# Patient Record
Sex: FEMALE | Race: BLACK OR AFRICAN AMERICAN | NOT HISPANIC OR LATINO | Employment: UNEMPLOYED | URBAN - METROPOLITAN AREA
[De-identification: names, ages, dates, MRNs, and addresses within clinical notes are randomized per-mention and may not be internally consistent; named-entity substitution may affect disease eponyms.]

---

## 2017-02-02 ENCOUNTER — TELEPHONE (OUTPATIENT)
Dept: BARIATRICS | Facility: CLINIC | Age: 32
End: 2017-02-02

## 2017-02-02 DIAGNOSIS — E66.01 MORBID OBESITY DUE TO EXCESS CALORIES: Primary | ICD-10-CM

## 2017-02-02 DIAGNOSIS — Z98.84 STATUS POST LAPAROSCOPIC SLEEVE GASTRECTOMY: ICD-10-CM

## 2017-02-02 DIAGNOSIS — M79.606 PAIN OF LOWER EXTREMITY, UNSPECIFIED LATERALITY: ICD-10-CM

## 2017-02-02 NOTE — TELEPHONE ENCOUNTER
----- Message from Pat Baca sent at 2/2/2017  8:32 AM CST -----  Contact: Self   Trisha States that they need a call returned by a nurse in reference to some general questions she has.  Please call Trisha  @ [phone]. Thanks :)

## 2017-02-02 NOTE — TELEPHONE ENCOUNTER
Returned patient call.  Patient stated she moved back to [place].  She wants to resume care.  Patient is a sleeve patient of Dr. Anderson. Patient is interested in referral for paniculectomy.  Patient also stated that she still has her IVC filter in place.  appt and labs scheduled with PA for 2/8/17 &  2/9/16.  Changed patient address in epic Patient's insurance is not listed.  Transferred patient to phone room to update insurance.

## 2017-02-08 ENCOUNTER — LAB VISIT (OUTPATIENT)
Dept: LAB | Facility: HOSPITAL | Age: 32
End: 2017-02-08
Attending: SURGERY
Payer: MEDICAID

## 2017-02-08 DIAGNOSIS — Z98.84 STATUS POST LAPAROSCOPIC SLEEVE GASTRECTOMY: ICD-10-CM

## 2017-02-08 DIAGNOSIS — E55.9 VITAMIN D DEFICIENCY: Primary | ICD-10-CM

## 2017-02-08 DIAGNOSIS — E66.01 MORBID OBESITY DUE TO EXCESS CALORIES: ICD-10-CM

## 2017-02-08 DIAGNOSIS — M79.606 PAIN OF LOWER EXTREMITY, UNSPECIFIED LATERALITY: ICD-10-CM

## 2017-02-08 DIAGNOSIS — R17 ELEVATED BILIRUBIN: ICD-10-CM

## 2017-02-08 LAB
25(OH)D3+25(OH)D2 SERPL-MCNC: 12 NG/ML
ALBUMIN SERPL BCP-MCNC: 3.6 G/DL
ALP SERPL-CCNC: 40 U/L
ALT SERPL W/O P-5'-P-CCNC: 10 U/L
ANION GAP SERPL CALC-SCNC: 4 MMOL/L
AST SERPL-CCNC: 16 U/L
BASOPHILS # BLD AUTO: 0.02 K/UL
BASOPHILS NFR BLD: 0.5 %
BILIRUB SERPL-MCNC: 1.4 MG/DL
BUN SERPL-MCNC: 10 MG/DL
CALCIUM SERPL-MCNC: 9.2 MG/DL
CHLORIDE SERPL-SCNC: 104 MMOL/L
CHOLEST/HDLC SERPL: 3.6 {RATIO}
CO2 SERPL-SCNC: 29 MMOL/L
CREAT SERPL-MCNC: 0.8 MG/DL
DIFFERENTIAL METHOD: ABNORMAL
EOSINOPHIL # BLD AUTO: 0 K/UL
EOSINOPHIL NFR BLD: 0.7 %
ERYTHROCYTE [DISTWIDTH] IN BLOOD BY AUTOMATED COUNT: 13.3 %
EST. GFR  (AFRICAN AMERICAN): >60 ML/MIN/1.73 M^2
EST. GFR  (NON AFRICAN AMERICAN): >60 ML/MIN/1.73 M^2
GLUCOSE SERPL-MCNC: 87 MG/DL
HCT VFR BLD AUTO: 38.9 %
HDL/CHOLESTEROL RATIO: 28.1 %
HDLC SERPL-MCNC: 178 MG/DL
HDLC SERPL-MCNC: 50 MG/DL
HGB BLD-MCNC: 12.9 G/DL
IRON SERPL-MCNC: 112 UG/DL
LDLC SERPL CALC-MCNC: 116.4 MG/DL
LYMPHOCYTES # BLD AUTO: 2.2 K/UL
LYMPHOCYTES NFR BLD: 50.5 %
MCH RBC QN AUTO: 28.4 PG
MCHC RBC AUTO-ENTMCNC: 33.2 %
MCV RBC AUTO: 86 FL
MONOCYTES # BLD AUTO: 0.6 K/UL
MONOCYTES NFR BLD: 12.4 %
NEUTROPHILS # BLD AUTO: 1.6 K/UL
NEUTROPHILS NFR BLD: 35.7 %
NONHDLC SERPL-MCNC: 128 MG/DL
PLATELET # BLD AUTO: 360 K/UL
PMV BLD AUTO: 9.4 FL
POTASSIUM SERPL-SCNC: 4.2 MMOL/L
PROT SERPL-MCNC: 7.2 G/DL
RBC # BLD AUTO: 4.55 M/UL
SATURATED IRON: 29 %
SODIUM SERPL-SCNC: 137 MMOL/L
TOTAL IRON BINDING CAPACITY: 386 UG/DL
TRANSFERRIN SERPL-MCNC: 261 MG/DL
TRIGL SERPL-MCNC: 58 MG/DL
VIT B12 SERPL-MCNC: 475 PG/ML
WBC # BLD AUTO: 4.44 K/UL

## 2017-02-08 PROCEDURE — 82607 VITAMIN B-12: CPT

## 2017-02-08 PROCEDURE — 83540 ASSAY OF IRON: CPT

## 2017-02-08 PROCEDURE — 84425 ASSAY OF VITAMIN B-1: CPT

## 2017-02-08 PROCEDURE — 80061 LIPID PANEL: CPT

## 2017-02-08 PROCEDURE — 85025 COMPLETE CBC W/AUTO DIFF WBC: CPT

## 2017-02-08 PROCEDURE — 80053 COMPREHEN METABOLIC PANEL: CPT

## 2017-02-08 PROCEDURE — 82306 VITAMIN D 25 HYDROXY: CPT

## 2017-02-08 PROCEDURE — 36415 COLL VENOUS BLD VENIPUNCTURE: CPT

## 2017-02-08 RX ORDER — ERGOCALCIFEROL 1.25 MG/1
50000 CAPSULE ORAL
Qty: 24 CAPSULE | Refills: 0 | Status: SHIPPED | OUTPATIENT
Start: 2017-02-09 | End: 2017-02-09 | Stop reason: SDUPTHER

## 2017-02-09 ENCOUNTER — OFFICE VISIT (OUTPATIENT)
Dept: BARIATRICS | Facility: CLINIC | Age: 32
End: 2017-02-09
Payer: MEDICAID

## 2017-02-09 VITALS
WEIGHT: 246.5 LBS | HEIGHT: 59 IN | DIASTOLIC BLOOD PRESSURE: 76 MMHG | SYSTOLIC BLOOD PRESSURE: 120 MMHG | BODY MASS INDEX: 49.69 KG/M2 | HEART RATE: 92 BPM

## 2017-02-09 DIAGNOSIS — L30.9 DERMATITIS: ICD-10-CM

## 2017-02-09 DIAGNOSIS — R79.89 ELEVATED LFTS: Primary | ICD-10-CM

## 2017-02-09 DIAGNOSIS — Z98.84 S/P LAPAROSCOPIC SLEEVE GASTRECTOMY: ICD-10-CM

## 2017-02-09 DIAGNOSIS — E55.9 VITAMIN D DEFICIENCY: ICD-10-CM

## 2017-02-09 DIAGNOSIS — E66.01 MORBID OBESITY WITH BMI OF 45.0-49.9, ADULT: ICD-10-CM

## 2017-02-09 DIAGNOSIS — E65 ABDOMINAL PANNUS: ICD-10-CM

## 2017-02-09 PROCEDURE — 99999 PR PBB SHADOW E&M-EST. PATIENT-LVL III: CPT | Mod: PBBFAC,,, | Performed by: PHYSICIAN ASSISTANT

## 2017-02-09 PROCEDURE — 99205 OFFICE O/P NEW HI 60 MIN: CPT | Mod: S$PBB,,, | Performed by: PHYSICIAN ASSISTANT

## 2017-02-09 PROCEDURE — 99213 OFFICE O/P EST LOW 20 MIN: CPT | Mod: PBBFAC | Performed by: PHYSICIAN ASSISTANT

## 2017-02-09 RX ORDER — ERGOCALCIFEROL 1.25 MG/1
50000 CAPSULE ORAL
Qty: 24 CAPSULE | Refills: 0 | Status: SHIPPED | OUTPATIENT
Start: 2017-02-09 | End: 2017-05-02

## 2017-02-09 RX ORDER — NYSTATIN 100000 [USP'U]/G
POWDER TOPICAL 2 TIMES DAILY
Qty: 60 G | Refills: 5 | Status: SHIPPED | OUTPATIENT
Start: 2017-02-09 | End: 2017-05-02

## 2017-02-09 RX ORDER — CLOTRIMAZOLE AND BETAMETHASONE DIPROPIONATE 10; .64 MG/G; MG/G
CREAM TOPICAL 2 TIMES DAILY
Qty: 45 G | Refills: 5 | Status: SHIPPED | OUTPATIENT
Start: 2017-02-09 | End: 2017-05-02

## 2017-02-09 RX ORDER — OMEPRAZOLE 40 MG/1
40 CAPSULE, DELAYED RELEASE ORAL EVERY MORNING
Qty: 30 CAPSULE | Refills: 12 | Status: SHIPPED | OUTPATIENT
Start: 2017-02-09 | End: 2017-05-02

## 2017-02-09 NOTE — PATIENT INSTRUCTIONS
- To lose weight you want to cut 100% starchy carbohydrates out of your diet (bread, rice, pasta, potatoes, granola, flour, corn, peas, oatmeal, grits, tortillas, crackers, chips) and get  grams of protein.  Aim for 100 grams of protein daily.    - Premier Protein (Chocolate, Bananas & Cream, Strawberries & Cream, Vanilla) [employer] or [employer]    - Syntrax [place] from [employer], www.bariatricadvantage.com, www.bariatricchoice.com. (LACTOSE FREE)    - Atkins Lift - [employer] & [employer] (LACTOSE FREE)    - Veggetti Pro from [employer], [employer], [employer]    - www.pinterest.com (cauliflower, cloud bread, quest bar cookies, eggplant, zucchini, zucchini noodles, crustless quiche, no carb meals, taco lettuce boats)    - http://rajendra.Sigmatix.com/    - Cauliflower Rice

## 2017-02-09 NOTE — PROGRESS NOTES
BARIATRIC FOLLOW UP    Chief Complaint   Patient presents with    Follow-up     sleeve 2013     HPI: Trisha Ferguson is a 31 y.o. female with a Body mass index is 49.78 kg/(m^2). who presents for a follow up of Sleeve Gastrectomy on 11/26/2013 with Dr. Anderson.  She is tolerating the diet without difficulty.  Her lowest weight after surgery was 202 lbs.  She has regained about 45 lbs after a scare of her possibly having Uterine Cancer.  She does not have cancer.  She is finding it difficult to get the weight off, especially since the excess skin in her abdomen and thighs are making it difficulty to exercise.  She gets rashes and breakdown of skin that do not respond to treatment.  She has lost about 99 lbs, approximately 45% of her excess weight.  She would like a consult to plastic surgery.                                                                       EXERCISE and VITAMINS: Reviewed in bariatric assessment.                                                                                DIET:  Regular Bariatric Diet.  Breakfast: 1 hardboiled egg (8g), Sn: 1 string cheese (6 g), Lunch: Protein shake (15 g), Sn: Protein shake (15 g), Di: green beans and baked chicken (20-25 g).  ~65-70 grams protein daily.                                                                                                                                 MEDICATIONS AND ALLERGIES:  Reviewed in Navigator.    Review of Systems   Constitutional: Negative for chills, diaphoresis, fatigue and fever.   HENT: Negative for facial swelling, sore throat and trouble swallowing.    Eyes: Negative for photophobia and visual disturbance.   Respiratory: Negative for cough, choking, chest tightness and shortness of breath.    Cardiovascular: Negative for chest pain, palpitations and leg swelling.   Gastrointestinal: Positive for constipation (sometimes). Negative for abdominal distention, abdominal pain, anal bleeding, blood in stool, diarrhea,  nausea, rectal pain and vomiting.   Genitourinary: Positive for menstrual problem (longer and heavier period). Negative for decreased urine volume, difficulty urinating, dysuria, enuresis, frequency and hematuria.   Musculoskeletal: Positive for arthralgias (leg pain), myalgias (calf ) and neck pain. Negative for back pain, gait problem, joint swelling and neck stiffness.   Skin: Positive for rash (under excess skin of abdomen and in inner thighs). Negative for color change.   Neurological: Negative for dizziness, seizures, syncope, weakness, light-headedness, numbness and headaches.   Psychiatric/Behavioral: Positive for behavioral problems.     Vitals:    02/09/17 1402   BP: 120/76   Pulse: 92     Physical Exam   Constitutional: She is oriented to person, place, and time. She appears well-developed and well-nourished.   HENT:   Head: Normocephalic and atraumatic.   Cardiovascular: Normal rate, regular rhythm, normal heart sounds and intact distal pulses.    Pulmonary/Chest: Effort normal and breath sounds normal.   Abdominal: Soft. Bowel sounds are normal. She exhibits no distension and no mass. There is no tenderness. There is no rebound and no guarding.   Musculoskeletal: Normal range of motion. She exhibits no edema or tenderness.   Neurological: She is alert and oriented to person, place, and time.   Skin: Skin is warm and dry. No rash noted. No erythema. No pallor.        Nursing note and vitals reviewed.      ASSESSMENT:  - Dermatitis and skin breakdown under abdominal pannus and on inner thigh.  - Morbid Obesity, Body mass index is 49.78 kg/(m^2). s/p Lap Sleeve on 11/26/2013  - Good weight loss, 100 lbs, 45% EWL .  -  Co Morbidities: none  -  Fair diet, inadequate protein intake  -  No exercise, limited d/t skin breakdown and chaffing of thighs.  -  Fair vitamin regimen.  -  Not at risk for fall or abuse.      PLAN:  - [facility] Consult for excess skin removal/panniculectomy.  - Reviewed the  nutrition booklet and diet.  She needs to increase her protein intake and take out all starches from her diet.  Gave handouts, education and all questions answered.  - No Bariatric Registered Dietician Available.  All Diet education and counseling done by PA.  - Patient to follow bariatric diet plan.  - Patient to exercise on a regular basis.  - Patient to take bariatric vitamin regimen: MVI, calcium citrate, and Vitamin B12 as directed.  - Call the office for any issues.  - Labs today.  - RTC in 1 month with food records.    60 minute visit, over 50% of time spent counseling patient face to face on diet, exercise, and weight loss.

## 2017-02-09 NOTE — MR AVS SNAPSHOT
[facility]  [address] [facility] [address]  Phone: [phone]  Fax: [phone]                  Trisha Ferguson   2017 1:40 PM   Office Visit    Description:  Female : 1985   Provider:  Veronika Pugh PA-C   Department:  [facility] - Bariatric Surgery           Reason for Visit     Follow-up           Diagnoses this Visit        Comments    Elevated LFTs    -  Primary     Vitamin D deficiency                To Do List           Goals (5 Years of Data)     None       These Medications        Disp Refills Start End    omeprazole (PRILOSEC) 40 MG capsule 30 capsule 12 2017     Take 1 capsule (40 mg total) by mouth every morning. - Oral    Pharmacy: [facility] [address] [place] [address] Ph #: [phone]       nystatin (MYCOSTATIN) powder 60 g 5 2017     Apply topically 2 (two) times daily. - Topical (Top)    Pharmacy: [facility] [address] [place] [address] Ph #: [phone]       clotrimazole-betamethasone 1-0.05% (LOTRISONE) cream 45 g 5 2017     Apply topically 2 (two) times daily. - Topical (Top)    Pharmacy: [facility] [address] [place] [address] Ph #: [phone]       ergocalciferol (ERGOCALCIFEROL) 50,000 unit Cap 24 capsule 0 2017     Take 1 capsule (50,000 Units total) by mouth twice a week. - Oral    Pharmacy: [facility] [address] [place] [address] Ph #: [phone]         [facility]     [facility] On [facility] Nurse Care Line -  Assistance  Registered nurses in the [facility] provide clinical advisement, health education, appointment booking, and other advisory services.  Call for this free service at [phone].             Medications           Message regarding Medications     Verify the changes and/or additions to your medication regime listed below are the same as discussed with your clinician today.  If any of these changes or  "additions are incorrect, please notify your healthcare provider.        START taking these NEW medications        Refills    omeprazole (PRILOSEC) 40 MG capsule 12    Sig: Take 1 capsule (40 mg total) by mouth every morning.    Class: Normal    Route: Oral    nystatin (MYCOSTATIN) powder 5    Sig: Apply topically 2 (two) times daily.    Class: Normal    Route: Topical (Top)    clotrimazole-betamethasone 1-0.05% (LOTRISONE) cream 5    Sig: Apply topically 2 (two) times daily.    Class: Normal    Route: Topical (Top)           Verify that the below list of medications is an accurate representation of the medications you are currently taking.  If none reported, the list may be blank. If incorrect, please contact your healthcare provider. Carry this list with you in case of emergency.           Current Medications     cyanocobalamin, vitamin B-12, (VITAMIN B-12) 1,000 mcg Subl Place under the tongue.    ergocalciferol (ERGOCALCIFEROL) 50,000 unit Cap Take 1 capsule (50,000 Units total) by mouth twice a week.    multivitamin (ONE DAILY MULTIVITAMIN) per tablet Take 1 tablet by mouth once daily.    clotrimazole-betamethasone 1-0.05% (LOTRISONE) cream Apply topically 2 (two) times daily.    nystatin (MYCOSTATIN) powder Apply topically 2 (two) times daily.    omeprazole (PRILOSEC) 40 MG capsule Take 1 capsule (40 mg total) by mouth every morning.           Clinical Reference Information           Your Vitals Were     BP Pulse Height Weight Last Period BMI    120/76 92 4' 11" (1.499 m) 111.8 kg (246 lb 7.6 oz) 01/15/2017 49.78 kg/m2      Blood Pressure          Most Recent Value    BP  120/76      Allergies as of 2/9/2017     No Known Allergies      Immunizations Administered on Date of Encounter - 2/9/2017     None      Orders Placed During Today's Visit     Future Labs/Procedures Expected by Expires    Hepatic function panel  2/9/2017 4/10/2018      Instructions    - To lose weight you want to cut 100% starchy " carbohydrates out of your diet (bread, rice, pasta, potatoes, granola, flour, corn, peas, oatmeal, grits, tortillas, crackers, chips) and get  grams of protein.  Aim for 100 grams of protein daily.    - Premier Protein (Chocolate, Bananas & Cream, Strawberries & Cream, Vanilla) [employer] or [employer]    - Syntrax [place] from [employer], www.bariatricadvantage.com, www.bariatricchoice.com. (LACTOSE FREE)    - Atkins Lift - [employer] & [employer] (LACTOSE FREE)    - Veggetti Pro from [employer], [employer], [employer]    - www.pinterest.com (cauliflower, cloud bread, quest bar cookies, eggplant, zucchini, zucchini noodles, crustless quiche, no carb meals, taco lettuce boats)    - http://theworldaccordingtoegnoe.DiabetOmics.com/    - Cauliflower Rice       Language Assistance Services     ATTENTION: Language assistance services are available, free of charge. Please call [phone].      ATENCIÓN: Si habla español, tiene a ye disposición servicios gratuitos de asistencia lingüística. Llame al [phone].     RINA Ý: N?u b?n nói Ti?ng Vi?t, có các d?ch v? h? tr? ngôn ng? mi?n phí dành cho b?n. G?i s? [phone].         [facility] complies with applicable Federal civil rights laws and does not discriminate on the basis of race, color, national origin, age, disability, or sex.

## 2017-02-09 NOTE — LETTER
February 14, 2017    [facility]               [facility]  [address]  [place] [address]  Phone: [phone]  Fax: [phone]   Patient: Trisha Ferguson   MR Number: [id number]   YOB: 1985   Date of Visit: 2/9/2017       Dear [facility]:    I am referring my patient, Trisha Ferguson, to you for evaluation of abdominal pannus and excess skin of upper thighs.   She had a Sleeve Gastrectomy in 2013 and has lost more than 100 lbs.  She is having difficulty with exercise and walking causing skin breakdown of inner thighs.  She is getting rashes under the abdomen and inner thighs and neither respond to treatment.  In my medical opinion an abdominal panniculectomy is medically necessary.    I appreciate your assistance in her care and look forward to your findings and recommendations.    Sincerely,                         Veronika Joyce PA-C

## 2017-02-10 LAB — VIT B1 SERPL-MCNC: 39 UG/L (ref 38–122)

## 2017-02-14 PROBLEM — Z98.84 S/P LAPAROSCOPIC SLEEVE GASTRECTOMY: Status: ACTIVE | Noted: 2017-02-14

## 2017-02-14 PROBLEM — L30.9 DERMATITIS: Status: ACTIVE | Noted: 2017-02-14

## 2017-02-14 PROBLEM — E66.01 MORBID OBESITY WITH BMI OF 45.0-49.9, ADULT: Status: ACTIVE | Noted: 2017-02-14

## 2017-02-14 PROBLEM — R79.89 ELEVATED LFTS: Status: ACTIVE | Noted: 2017-02-14

## 2017-03-22 ENCOUNTER — PATIENT MESSAGE (OUTPATIENT)
Dept: BARIATRICS | Facility: CLINIC | Age: 32
End: 2017-03-22

## 2017-03-22 ENCOUNTER — TELEPHONE (OUTPATIENT)
Dept: BARIATRICS | Facility: CLINIC | Age: 32
End: 2017-03-22

## 2017-03-22 NOTE — TELEPHONE ENCOUNTER
Called in respond to patient's message.    Patient reports that her company recently changed the chairs that she sits in and that the seats are much lower than her old chair and her IVC filter hurts her. At the advisement of her company's HR department, she called to request a letter stating that she needs different seating accomodations 2/2 IVC filter placement.     Gave her the contact information for Vascular Surgery, [phone] or [phone]. Encouraged patient to call clinic back if she needed any further assistance.

## 2017-03-23 ENCOUNTER — HOSPITAL ENCOUNTER (OUTPATIENT)
Dept: VASCULAR SURGERY | Facility: CLINIC | Age: 32
Discharge: HOME | End: 2017-03-23
Attending: SURGERY
Payer: MEDICAID

## 2017-03-23 ENCOUNTER — OFFICE VISIT (OUTPATIENT)
Dept: VASCULAR SURGERY | Facility: CLINIC | Age: 32
End: 2017-03-23
Payer: MEDICAID

## 2017-03-23 VITALS
WEIGHT: 262 LBS | DIASTOLIC BLOOD PRESSURE: 67 MMHG | SYSTOLIC BLOOD PRESSURE: 111 MMHG | BODY MASS INDEX: 49.47 KG/M2 | TEMPERATURE: 99 F | HEIGHT: 61 IN | HEART RATE: 98 BPM

## 2017-03-23 DIAGNOSIS — M79.609 PAIN IN LIMB: ICD-10-CM

## 2017-03-23 DIAGNOSIS — M79.605 PAIN OF LEFT LOWER EXTREMITY: Primary | ICD-10-CM

## 2017-03-23 DIAGNOSIS — E66.01 MORBID OBESITY WITH BMI OF 45.0-49.9, ADULT: Primary | ICD-10-CM

## 2017-03-23 DIAGNOSIS — E66.01 MORBID OBESITY WITH BMI OF 45.0-49.9, ADULT: ICD-10-CM

## 2017-03-23 PROCEDURE — 99213 OFFICE O/P EST LOW 20 MIN: CPT | Mod: PBBFAC | Performed by: SURGERY

## 2017-03-23 PROCEDURE — 99204 OFFICE O/P NEW MOD 45 MIN: CPT | Mod: S$PBB,,, | Performed by: SURGERY

## 2017-03-23 PROCEDURE — 99999 PR PBB SHADOW E&M-EST. PATIENT-LVL III: CPT | Mod: PBBFAC,,, | Performed by: SURGERY

## 2017-03-23 PROCEDURE — 93970 EXTREMITY STUDY: CPT | Mod: 26,S$PBB,, | Performed by: SURGERY

## 2017-03-23 NOTE — PROGRESS NOTES
Trisha Ferguson  2017    HPI:  Patient is a 31 y.o. female with a h/o morbid obesity s.p bariatric procedure (lap sleeve gastrectomy in )  who is here today for evaluation of her previously-placed filter  She is now early in her 2nd pregnancy.  Unfortunately, she did not fu within her post-op period and this is the 1st time w fu since 2013    S/p  2013:   Insertion of IVC filter - R CFV: Bard Baxter    Findings/D[employer]:  Successful deployment in IVC; no tilt noted    Denies diabetes, hypertension, hyperlipidemia, DVT or PE, heart disease    Past Medical History:   Diagnosis Date    Cervical dysplasia     Gonorrhea     Morbid obesity      Past Surgical History:   Procedure Laterality Date    CERVIX SURGERY       SECTION, CLASSIC      CHOLECYSTECTOMY           DILATION AND CURETTAGE OF UTERUS      GASTRECTOMY  2013    Sleeve    gastric sleeve  13     Family History   Problem Relation Age of Onset    Diabetes Maternal Grandmother       of MI at 63     Heart disease Maternal Grandmother     Hypertension Maternal Grandmother     Stroke Maternal Grandmother     Obesity Maternal Grandmother     Obesity Mother       at 26 years of Gun shot wound    Diabetes Mother     Diabetes Father      Social History     Social History    Marital status: Single     Spouse name: N/A    Number of children: N/A    Years of education: N/A     Occupational History    Not on file.     Social History Main Topics    Smoking status: Never Smoker    Smokeless tobacco: Not on file    Alcohol use No      Comment: rare use (on occasion only)    Drug use: No    Sexual activity: Yes     Partners: Male     Other Topics Concern    Not on file     Social History Narrative       Current Outpatient Prescriptions:     clotrimazole-betamethasone 1-0.05% (LOTRISONE) cream, Apply topically 2 (two) times daily., Disp: 45 g, Rfl: 5    cyanocobalamin, vitamin B-12,  (VITAMIN B-12) 1,000 mcg Subl, Place under the tongue., Disp: , Rfl:     ergocalciferol (ERGOCALCIFEROL) 50,000 unit Cap, Take 1 capsule (50,000 Units total) by mouth twice a week., Disp: 24 capsule, Rfl: 0    multivitamin (ONE DAILY MULTIVITAMIN) per tablet, Take 1 tablet by mouth once daily., Disp: , Rfl:     nystatin (MYCOSTATIN) powder, Apply topically 2 (two) times daily., Disp: 60 g, Rfl: 5    omeprazole (PRILOSEC) 40 MG capsule, Take 1 capsule (40 mg total) by mouth every morning., Disp: 30 capsule, Rfl: 12    REVIEW OF SYSTEMS:  General: negative; ENT: negative; Allergy and Immunology: negative; Hematological and Lymphatic: Negative; Endocrine: negative; Respiratory: no cough, shortness of breath, or wheezing; Cardiovascular: no chest pain or dyspnea on exertion; Gastrointestinal: no abdominal pain/back, change in bowel habits, or bloody stools; Genito-Urinary: no dysuria, trouble voiding, or hematuria; Musculoskeletal: negative  Neurological: no TIA or stroke symptoms; Psychiatric: no nervousness, anxiety or depression.    PHYSICAL EXAM:   Right Arm BP - Sittin/67 (17 1325)  Left Arm BP - Sittin/98 (17 1325)  Pulse: 98  Temp: 98.8 °F (37.1 °C)      General appearance:  Alert, well-appearing, and in no distress.  Oriented to person, place, and time. Obesity   Neurological: Normal speech, no focal findings noted; CN II - XII grossly intact           Musculoskeletal: Digits/nail without cyanosis/clubbing.  Normal muscle strength/tone.                 Neck: Supple, no significant adenopathy; thyroid is not enlarged                  No carotid bruit can be auscultated                Chest:  Clear to auscultation, no wheezes, rales or rhonchi, symmetric air entry     No use of accessory muscles             Cardiac: Normal rate and regular rhythm, S1 and S2 normal; PMI non-displaced          Abdomen: Soft, nontender, nondistended, no masses or organomegaly     No rebound tenderness  noted; bowel sounds normal     Pulsatile aortic mass is not palpable.     No groin adenopathy      Extremities:   2+ femoral pulses bilaterally     No ulcerations    LAB RESULTS:  Lab Results   Component Value Date    K 4.2 02/08/2017    K 3.7 10/02/2014    K 3.2 (L) 12/14/2013    CREATININE 0.8 02/08/2017    CREATININE 0.7 10/02/2014    CREATININE 0.9 12/14/2013     Lab Results   Component Value Date    WBC 4.44 02/08/2017    WBC 7.39 10/02/2014    WBC 7.05 12/11/2013    HCT 38.9 02/08/2017    HCT 38.1 10/02/2014    HCT 41.8 12/11/2013     (H) 02/08/2017     (H) 10/02/2014     (H) 12/11/2013     Lab Results   Component Value Date    HGBA1C 5.6 10/11/2013     IMAGING:  US: no DVT    IMP/PLAN:  31 y.o. female with  h/o morbid obesity s.p bariatric procedure (lap sleeve gastrectomy in 2013)  who is here today for evaluation of her previously-placed filter.  Unfortunately, she did not fu within her post-op period and this is the 1st time w fu since Nov 2013  I explained to her that it would not be wise to attempt to retrieve this now given that it has been in now for > 3 1/2 yrs and risk of ivc injury.  She understands that the missed safe window for retrieval has passed.  Fu JAYY Malik MD FACS  Vascular/Endovascular Surgery

## 2017-04-11 ENCOUNTER — HOSPITAL ENCOUNTER (EMERGENCY)
Facility: HOSPITAL | Age: 32
Discharge: HOME | End: 2017-04-11
Attending: EMERGENCY MEDICINE
Payer: MEDICAID

## 2017-04-11 VITALS
BODY MASS INDEX: 52.22 KG/M2 | SYSTOLIC BLOOD PRESSURE: 116 MMHG | TEMPERATURE: 98 F | OXYGEN SATURATION: 97 % | HEART RATE: 74 BPM | HEIGHT: 60 IN | RESPIRATION RATE: 17 BRPM | DIASTOLIC BLOOD PRESSURE: 58 MMHG | WEIGHT: 266 LBS

## 2017-04-11 DIAGNOSIS — O20.0 THREATENED MISCARRIAGE IN EARLY PREGNANCY: Primary | ICD-10-CM

## 2017-04-11 LAB
BACTERIA #/AREA URNS HPF: ABNORMAL /HPF
BACTERIA GENITAL QL WET PREP: ABNORMAL
BILIRUB UR QL STRIP: NEGATIVE
CLARITY UR: CLEAR
CLUE CELLS VAG QL WET PREP: ABNORMAL
COLOR UR: YELLOW
FILAMENT FUNGI VAG WET PREP-#/AREA: ABNORMAL
GLUCOSE UR QL STRIP: NEGATIVE
HCG INTACT+B SERPL-ACNC: NORMAL MIU/ML
HGB UR QL STRIP: ABNORMAL
KETONES UR QL STRIP: NEGATIVE
LEUKOCYTE ESTERASE UR QL STRIP: NEGATIVE
MICROSCOPIC COMMENT: ABNORMAL
NITRITE UR QL STRIP: NEGATIVE
PH UR STRIP: 5 [PH] (ref 5–8)
PROT UR QL STRIP: NEGATIVE
RBC #/AREA URNS HPF: 5 /HPF (ref 0–4)
SP GR UR STRIP: 1.02 (ref 1–1.03)
SPECIMEN SOURCE: ABNORMAL
SQUAMOUS #/AREA URNS HPF: 2 /HPF
T VAGINALIS GENITAL QL WET PREP: ABNORMAL
URN SPEC COLLECT METH UR: ABNORMAL
UROBILINOGEN UR STRIP-ACNC: ABNORMAL EU/DL
WBC #/AREA URNS HPF: 2 /HPF (ref 0–5)
WBC #/AREA VAG WET PREP: ABNORMAL
YEAST GENITAL QL WET PREP: ABNORMAL

## 2017-04-11 PROCEDURE — 84702 CHORIONIC GONADOTROPIN TEST: CPT

## 2017-04-11 PROCEDURE — 87210 SMEAR WET MOUNT SALINE/INK: CPT

## 2017-04-11 PROCEDURE — 81000 URINALYSIS NONAUTO W/SCOPE: CPT

## 2017-04-11 PROCEDURE — 99283 EMERGENCY DEPT VISIT LOW MDM: CPT

## 2017-04-11 PROCEDURE — 87591 N.GONORRHOEAE DNA AMP PROB: CPT

## 2017-04-11 NOTE — ED AVS SNAPSHOT
[facility]  [place] [address]               Trisha Paniaguatis   2017  6:45 PM   ED    Description:  Female : 1985   Department:  [facility]           Your Care was Coordinated By:     Provider Role From To    Matt Dorado III, MD Attending Provider 17 --    Kavon Ochoa PA-C Physician Assistant 17 --      Reason for Visit     Abdominal Cramping     Vaginal Bleeding           Diagnoses this Visit        Comments    Threatened miscarriage in early pregnancy    -  Primary       ED Disposition     None           To Do List           Follow-up Information     Follow up with OLGA Coates MD. Schedule an appointment as soon as possible for a visit in 1 day.    Specialty:  Obstetrics    Why:  For further evaluation    Contact information:    [address]  [facility] [address] [phone]          Go to [facility].    Specialty:  Emergency Medicine    Why:  If symptoms worsen    Contact information:    [address] [place] [address] [phone]      [facility]     [facility] Nurse Care Line - 24/ Assistance  Unless otherwise directed by your provider, please contact [facility], our nurse care line that is available for 24/ assistance.     Registered nurses in the [facility] provide: appointment scheduling, clinical advisement, health education, and other advisory services.  Call: [phone] (toll free)               Medications           Message regarding Medications     Verify the changes and/or additions to your medication regime listed below are the same as discussed with your clinician today.  If any of these changes or additions are incorrect, please notify your healthcare provider.             Verify that the below list of medications is an accurate representation of the medications you are currently taking.  If  none reported, the list may be blank. If incorrect, please contact your healthcare provider. Carry this list with you in case of emergency.           Current Medications     PRENATAL VIT/IRON FUMARATE/FA (PRENATAL 1+1 ORAL) Take by mouth.    clotrimazole-betamethasone 1-0.05% (LOTRISONE) cream Apply topically 2 (two) times daily.    cyanocobalamin, vitamin B-12, (VITAMIN B-12) 1,000 mcg Subl Place under the tongue.    ergocalciferol (ERGOCALCIFEROL) 50,000 unit Cap Take 1 capsule (50,000 Units total) by mouth twice a week.    multivitamin (ONE DAILY MULTIVITAMIN) per tablet Take 1 tablet by mouth once daily.    nystatin (MYCOSTATIN) powder Apply topically 2 (two) times daily.    omeprazole (PRILOSEC) 40 MG capsule Take 1 capsule (40 mg total) by mouth every morning.           Clinical Reference Information           Your Vitals Were     BP Pulse Temp Resp Height Weight    116/58 (BP Location: Left arm, Patient Position: Sitting, BP Method: Automatic) 74 98.1 °F (36.7 °C) (Oral) 17 5' (1.524 m) 120.7 kg (266 lb)    Last Period SpO2 BMI          2017 97% 51.95 kg/m2        Allergies as of 2017     No Known Allergies      Immunizations Administered on Date of Encounter - 2017     None      ED Micro, Lab, POCT     Start Ordered       Status Ordering Provider    17  hCG, quantitative, pregnancy  STAT      Final result     17  Vaginal Screen Vagina  STAT      In process     17  C. trachomatis/N. gonorrhoeae by AMP DNA Cervix  STAT      In process     17  Urinalysis  STAT      Final result     17  Urinalysis Microscopic  Once      Final result     17    Once,   Status:  Canceled      Canceled       ED Imaging Orders     None        Discharge Instructions         Possible Miscarriage (Threatened )  You may be having a miscarriage.  Common signs of a  miscarriage are pain and bleeding. A small amount of bleeding can be normal during the first 3 months of pregnancy. Often the pain and bleeding stop, and you have a normal pregnancy and baby. But heavy bleeding or severe cramping can be an early sign of miscarriage. A miscarriage means an unexpected loss of your pregnancy.  At this time, your healthcare provider doesnt know whether you will have a miscarriage, or if things will clear up and your pregnancy will continue normally. This can be emotionally difficult. There is little that can be done to change the way you feel. But understand that miscarriages are common.  About 1 or 2 out of every 10 pregnancies end this way. Some even end before you know you are pregnant. This happens for a number of reasons, and usually the cause is never known. Its important you know that it is not your fault. It didnt happen because you did anything wrong.  Having sex or exercising does not cause a miscarriage. These activities are usually safe unless you have pain or bleeding or your doctor tells you to stop. Even minor falls wont cause a miscarriage. Miscarriages happen because things were not developing as they were supposed to. No medicine can prevent a miscarriage.  Again, understand that things are uncertain right now. You may still have some bleeding. This may be light spotting or like a period, and you may pass some tissue. You may have some cramping. This is why follow-up care is important.  Home care  To improve the chance of keeping your pregnancy, you should take these steps:  · Rest in bed until the pain and bleeding stop.  · Dont have sex until your healthcare provider says its OK.  · Use sanitary napkins instead of tampons.  · Dont douche.  · Dont take aspirin, ibuprofen, or naproxen.  · Dont have alcoholic or caffeinated beverages or smoke.  Follow-up care  Make an appointment with your doctor within the next week, or as directed.  If you had an  ultrasound, a radiologist will review it. You will be told of any new findings that may affect your care.  Call 911  Call 911 if you have:  · Severe pain and very heavy bleeding  · Severe lightheadedness, passing out, or fainting  · Rapid heart rate  · Difficulty breathing  · Confusion or difficulty waking up  When to seek medical advice  Call your healthcare provider right away if any of these occur:  · Vaginal bleeding or pain that lasts for more than 3 days  · Heavy bleeding. This means soaking 1 new pad an hour over 3 hours.  · Fever of 100.4°F (38°C) or higher, or as directed by your healthcare provider  · Pain in your lower belly (abdomen) that gets worse  · Weakness or dizziness  · Passage of anything that resembles tissue. This would be pink or grayish membrane or solid material. Save the tissue in a clean container and bring it to your provider.  Date Last Reviewed: 9/1/2016  © 0498-5250 [employer]. [address] [place] [address]. All rights reserved. This information is not intended as a substitute for professional medical care. Always follow your healthcare professional's instructions.           [facility] complies with applicable Federal civil rights laws and does not discriminate on the basis of race, color, national origin, age, disability, or sex.        Language Assistance Services     ATTENTION: Language assistance services are available, free of charge. Please call [phone].      ATENCIÓN: Si habla español, tiene a ye disposición servicios gratuitos de asistencia lingüística. Llame al [phone].     CHÚ Ý: N?u b?n nói Ti?ng Vi?t, có các d?ch v? h? tr? ngôn ng? mi?n phí dành cho b?n. G?i s? [phone].

## 2017-04-11 NOTE — ED TRIAGE NOTES
Patient came in PER personal transport with c/o abdominal cramping and bleeding. Patient states that she is 8 weeks pregnant.

## 2017-04-12 LAB
C TRACH DNA SPEC QL NAA+PROBE: NOT DETECTED
N GONORRHOEA DNA SPEC QL NAA+PROBE: NOT DETECTED

## 2017-04-12 NOTE — ED PROVIDER NOTES
Encounter Date: 2017    SCRIBE #1 NOTE: I, Bari Petty , am scribing for, and in the presence of,  Kavon Ochoa PA-C . I have scribed the following portions of the note - Other sections scribed: HPI, ROS .       History     Chief Complaint   Patient presents with    Abdominal Cramping     States she is 8 weeks pregnant, cramping, and bleeding a lot    Vaginal Bleeding     Review of patient's allergies indicates:  No Known Allergies  HPI Comments: CC: Abdominal Cramping; Vaginal Bleeding     HPI: This 31 y.o. 8 weeks Gravid female, A1, with a medical history of Cervical dysplasia; Gonorrhea; history of miscarriage (1x episode); and Morbid obesity presents to the ED c/o multiple medical complaints. Pt is c/o acute onset bilateral lower abdominal pain that radiates to the lower back that began a few hours PTA. Pt is c/o acute onset vaginal bleeding with clotting that began a few hours PTA. Pt is unsure of any vaginal discharge. Pt reports moderate pain (7/10) that is exacerbated with palpation. Pt was seen at pt's OB/GYN earlier today for regular checkup and before any symptoms began. OB/GYN reports possible ovarian cyst, but pt cannot recall what side the cyst is on. Pt denies any fever, chest pain, N/V/D, dysuria, urine frequency change, or any other associated symptoms. Pt has no modifying factors. Pt has no prior treatment.         The history is provided by the patient. No  was used.     Past Medical History:   Diagnosis Date    Cervical dysplasia     Gonorrhea     Morbid obesity      Past Surgical History:   Procedure Laterality Date    CERVIX SURGERY       SECTION, CLASSIC      CHOLECYSTECTOMY           DILATION AND CURETTAGE OF UTERUS      GASTRECTOMY  2013    Sleeve    gastric sleeve  ..     Family History   Problem Relation Age of Onset    Diabetes Maternal Grandmother       of MI at 63     Heart disease Maternal Grandmother      Hypertension Maternal Grandmother     Stroke Maternal Grandmother     Obesity Maternal Grandmother     Obesity Mother       at 26 years of Gun shot wound    Diabetes Mother     Diabetes Father      Social History   Substance Use Topics    Smoking status: Never Smoker    Smokeless tobacco: None    Alcohol use No      Comment: rare use (on occasion only)     Review of Systems   Constitutional: Negative for fever.   HENT: Negative for sore throat.    Eyes: Negative for pain.   Respiratory: Negative for shortness of breath.    Cardiovascular: Negative for chest pain.   Gastrointestinal: Positive for abdominal pain (lower ). Negative for diarrhea, nausea and vomiting.   Genitourinary: Positive for vaginal bleeding (with clotting). Negative for dysuria and frequency.   Musculoskeletal: Positive for back pain (lower).   Skin: Negative for rash.   Neurological: Negative for headaches.       Physical Exam   Initial Vitals   BP Pulse Resp Temp SpO2   17 1801 17 1801 17 1801 17 1801 17 1801   135/68 80 18 98.2 °F (36.8 °C) 98 %     Physical Exam    Nursing note and vitals reviewed.  Constitutional: She appears well-developed and well-nourished. She is not diaphoretic. No distress.   HENT:   Head: Normocephalic and atraumatic.   Nose: Nose normal.   Eyes: Conjunctivae and EOM are normal. Right eye exhibits no discharge. Left eye exhibits no discharge.   Neck: Normal range of motion. No tracheal deviation present. No JVD present.   Cardiovascular: Normal rate, regular rhythm and normal heart sounds. Exam reveals no friction rub.    No murmur heard.  Pulmonary/Chest: Breath sounds normal. No stridor. No respiratory distress. She has no wheezes. She has no rhonchi. She has no rales. She exhibits no tenderness.   Abdominal: Soft. There is no tenderness. There is no rigidity, no rebound, no guarding, no CVA tenderness, no tenderness at McBurney's point and negative Rolon's sign.    Gravid abdomen   Genitourinary: Pelvic exam was performed with patient supine. There is no rash, tenderness or lesion on the right labia. There is no rash, tenderness or lesion on the left labia. Cervix exhibits no motion tenderness, no discharge and no friability. Right adnexum displays no mass and no tenderness. Left adnexum displays no mass and no tenderness. There is bleeding (scant bleeding from cervix) in the vagina. No erythema or tenderness in the vagina. No foreign body in the vagina. No signs of injury around the vagina. No vaginal discharge found.   Genitourinary Comments: Os closed   Musculoskeletal: Normal range of motion.   Neurological: She is alert and oriented to person, place, and time.   Skin: Skin is warm and dry. No rash and no abscess noted. No erythema. No pallor.         ED Course   Procedures  Labs Reviewed   VAGINAL SCREEN - Abnormal; Notable for the following:        Result Value    WBC - Vaginal Screen Few (*)     Bacteria - Vaginal Screen Occasional (*)     All other components within normal limits   URINALYSIS - Abnormal; Notable for the following:     Occult Blood UA 2+ (*)     Urobilinogen, UA 2.0-3.0 (*)     All other components within normal limits   URINALYSIS MICROSCOPIC - Abnormal; Notable for the following:     RBC, UA 5 (*)     All other components within normal limits   C. TRACHOMATIS/N. GONORRHOEAE BY AMP DNA   HCG, QUANTITATIVE, PREGNANCY             Medical Decision Making:   History:   Old Medical Records: I decided to obtain old medical records.      This is an emergent evaluation of a 31 y.o. female, A1, 6w3d pregnant (by US performed today at [facility]; patient has documentation and images with her; reviewed with Dr. Dorado), no PMHx presenting to the ED for vaginal bleeding associated with lower abdominal cramping. Denies fever and urinary symptoms. Vitals WNL, afebrile. Patient is non-toxic appearing and in no acute distress. Pelvic exam not concerning for PID and  cervix closed. ABO  Rh (+) from prior records. Beta-hCG 26,201 today, with no previous value to compare to. UA shows no evidence concerning for infection. Vaginal screen unremarkable. GC pending. No evidence of preeclampsia.     I am most suspicious for threatened miscarrage. IUP makes EP highly unlikely. Given the above, I have also considered but doubt and UTI/pyelonephritis, ovarian torsion, ovarian cyst rupture, appendicitis, PID, and salpingitis     Instructed to have prompt follow up with OBGYN for reevaluation and management of her symptoms. I issued the patient an educational handout on threatened miscarriage. Work note issued at patient's request.     I discussed with the patient the diagnosis, treatment plan, indications for return to the emergency department, and for expected follow-up. The patient verbalized an understanding. The patient is asked if there are any questions or concerns. We discuss the case, until all issues are addressed to the patients satisfaction. Patient understands and is agreeable to the plan.     I discussed this patient with Dr. Dorado who is in agreement with my assessment and plan.           Scribe Attestation:   Scribe #1: I performed the above scribed service and the documentation accurately describes the services I performed. I attest to the accuracy of the note.    Attending Attestation:     Physician Attestation Statement for NP/PA:   I discussed this assessment and plan of this patient with the NP/PA, but I did not personally examine the patient. The face to face encounter was performed by the NP/PA.    Other NP/PA Attestation Additions:      Medical Decision Makin-year-old pregnant female at 6 weeks gestational age who had an ultrasound performed today that confirmed intrauterine pregnancy now presents complaining of vaginal bleeding and lower abdominal cramping.  I have reviewed the patient's printed image of her outpatient ultrasound and confirmed that there is an  intrauterine fetal pole with crown-rump length corresponding to gestational age of 6 weeks and 3 days.  Abdominal exam benign, vital signs are normal, patient well-appearing.  O+.  Will obtain quantitative beta hCG and urinalysis.    Agree with above assessment and plan.           Physician Attestation for Scribe:  Physician Attestation Statement for Scribe #1: I, Kavon Ochoa PA-C , reviewed documentation, as scribed by Bari Petty  in my presence, and it is both accurate and complete.                 ED Course     Clinical Impression:   The encounter diagnosis was Threatened miscarriage in early pregnancy.    Disposition:   Disposition: Discharged  Condition: Stable       Kavon Ochoa PA-C  04/11/17 0587       Matt Dorado III, MD  04/11/17 3279

## 2017-04-12 NOTE — DISCHARGE INSTRUCTIONS
Possible Miscarriage (Threatened )  You may be having a miscarriage.  Common signs of a miscarriage are pain and bleeding. A small amount of bleeding can be normal during the first 3 months of pregnancy. Often the pain and bleeding stop, and you have a normal pregnancy and baby. But heavy bleeding or severe cramping can be an early sign of miscarriage. A miscarriage means an unexpected loss of your pregnancy.  At this time, your healthcare provider doesnt know whether you will have a miscarriage, or if things will clear up and your pregnancy will continue normally. This can be emotionally difficult. There is little that can be done to change the way you feel. But understand that miscarriages are common.  About 1 or 2 out of every 10 pregnancies end this way. Some even end before you know you are pregnant. This happens for a number of reasons, and usually the cause is never known. Its important you know that it is not your fault. It didnt happen because you did anything wrong.  Having sex or exercising does not cause a miscarriage. These activities are usually safe unless you have pain or bleeding or your doctor tells you to stop. Even minor falls wont cause a miscarriage. Miscarriages happen because things were not developing as they were supposed to. No medicine can prevent a miscarriage.  Again, understand that things are uncertain right now. You may still have some bleeding. This may be light spotting or like a period, and you may pass some tissue. You may have some cramping. This is why follow-up care is important.  Home care  To improve the chance of keeping your pregnancy, you should take these steps:  · Rest in bed until the pain and bleeding stop.  · Dont have sex until your healthcare provider says its OK.  · Use sanitary napkins instead of tampons.  · Dont douche.  · Dont take aspirin, ibuprofen, or naproxen.  · Dont have alcoholic or caffeinated beverages or smoke.  Follow-up care  Make  an appointment with your doctor within the next week, or as directed.  If you had an ultrasound, a radiologist will review it. You will be told of any new findings that may affect your care.  Call 911  Call 911 if you have:  · Severe pain and very heavy bleeding  · Severe lightheadedness, passing out, or fainting  · Rapid heart rate  · Difficulty breathing  · Confusion or difficulty waking up  When to seek medical advice  Call your healthcare provider right away if any of these occur:  · Vaginal bleeding or pain that lasts for more than 3 days  · Heavy bleeding. This means soaking 1 new pad an hour over 3 hours.  · Fever of 100.4°F (38°C) or higher, or as directed by your healthcare provider  · Pain in your lower belly (abdomen) that gets worse  · Weakness or dizziness  · Passage of anything that resembles tissue. This would be pink or grayish membrane or solid material. Save the tissue in a clean container and bring it to your provider.  Date Last Reviewed: 9/1/2016  © 4837-8127 [employer]. [address] [place] [address], [place] [address]. All rights reserved. This information is not intended as a substitute for professional medical care. Always follow your healthcare professional's instructions.

## 2017-05-02 ENCOUNTER — HOSPITAL ENCOUNTER (EMERGENCY)
Facility: HOSPITAL | Age: 32
Discharge: HOME | End: 2017-05-02
Attending: EMERGENCY MEDICINE
Payer: MEDICAID

## 2017-05-02 VITALS
OXYGEN SATURATION: 100 % | DIASTOLIC BLOOD PRESSURE: 66 MMHG | WEIGHT: 265 LBS | BODY MASS INDEX: 53.42 KG/M2 | HEART RATE: 80 BPM | HEIGHT: 59 IN | SYSTOLIC BLOOD PRESSURE: 117 MMHG | TEMPERATURE: 98 F | RESPIRATION RATE: 14 BRPM

## 2017-05-02 DIAGNOSIS — O03.9 SPONTANEOUS ABORTION AT 8 TO 28 WEEKS GESTATION: Primary | ICD-10-CM

## 2017-05-02 DIAGNOSIS — R10.2 PELVIC PAIN IN FEMALE: ICD-10-CM

## 2017-05-02 DIAGNOSIS — O20.9 VAGINAL BLEEDING BEFORE 22 WEEKS GESTATION: ICD-10-CM

## 2017-05-02 LAB
ABO + RH BLD: NORMAL
ALBUMIN SERPL BCP-MCNC: 3.5 G/DL
ALP SERPL-CCNC: 38 U/L
ALT SERPL W/O P-5'-P-CCNC: 8 U/L
ANION GAP SERPL CALC-SCNC: 8 MMOL/L
AST SERPL-CCNC: 16 U/L
B-HCG UR QL: POSITIVE
BACTERIA #/AREA URNS HPF: ABNORMAL /HPF
BASOPHILS # BLD AUTO: 0.02 K/UL
BASOPHILS NFR BLD: 0.3 %
BILIRUB SERPL-MCNC: 0.7 MG/DL
BILIRUB UR QL STRIP: NEGATIVE
BUN SERPL-MCNC: 11 MG/DL
CALCIUM SERPL-MCNC: 9 MG/DL
CHLORIDE SERPL-SCNC: 105 MMOL/L
CLARITY UR: CLEAR
CO2 SERPL-SCNC: 26 MMOL/L
COLOR UR: ABNORMAL
CREAT SERPL-MCNC: 0.8 MG/DL
CTP QC/QA: YES
DIFFERENTIAL METHOD: ABNORMAL
EOSINOPHIL # BLD AUTO: 0.1 K/UL
EOSINOPHIL NFR BLD: 0.9 %
ERYTHROCYTE [DISTWIDTH] IN BLOOD BY AUTOMATED COUNT: 13.2 %
EST. GFR  (AFRICAN AMERICAN): >60 ML/MIN/1.73 M^2
EST. GFR  (NON AFRICAN AMERICAN): >60 ML/MIN/1.73 M^2
GLUCOSE SERPL-MCNC: 84 MG/DL
GLUCOSE UR QL STRIP: ABNORMAL
HCG INTACT+B SERPL-ACNC: NORMAL MIU/ML
HCT VFR BLD AUTO: 37.7 %
HGB BLD-MCNC: 12.5 G/DL
HGB UR QL STRIP: ABNORMAL
HYALINE CASTS #/AREA URNS LPF: 0 /LPF
KETONES UR QL STRIP: NEGATIVE
LEUKOCYTE ESTERASE UR QL STRIP: NEGATIVE
LYMPHOCYTES # BLD AUTO: 3.3 K/UL
LYMPHOCYTES NFR BLD: 49.1 %
MCH RBC QN AUTO: 28.4 PG
MCHC RBC AUTO-ENTMCNC: 33.2 %
MCV RBC AUTO: 86 FL
MICROSCOPIC COMMENT: ABNORMAL
MONOCYTES # BLD AUTO: 0.8 K/UL
MONOCYTES NFR BLD: 11 %
NEUTROPHILS # BLD AUTO: 2.6 K/UL
NEUTROPHILS NFR BLD: 38.4 %
NITRITE UR QL STRIP: NEGATIVE
PH UR STRIP: 6 [PH] (ref 5–8)
PLATELET # BLD AUTO: 321 K/UL
PMV BLD AUTO: 9.4 FL
POTASSIUM SERPL-SCNC: 4.1 MMOL/L
PROT SERPL-MCNC: 6.9 G/DL
PROT UR QL STRIP: ABNORMAL
RBC # BLD AUTO: 4.4 M/UL
RBC #/AREA URNS HPF: >100 /HPF (ref 0–4)
SODIUM SERPL-SCNC: 139 MMOL/L
SP GR UR STRIP: 1 (ref 1–1.03)
SQUAMOUS #/AREA URNS HPF: 3 /HPF
URN SPEC COLLECT METH UR: ABNORMAL
UROBILINOGEN UR STRIP-ACNC: NEGATIVE EU/DL
WBC # BLD AUTO: 6.8 K/UL
WBC #/AREA URNS HPF: 3 /HPF (ref 0–5)

## 2017-05-02 PROCEDURE — 86901 BLOOD TYPING SEROLOGIC RH(D): CPT

## 2017-05-02 PROCEDURE — 25000003 PHARM REV CODE 250: Performed by: EMERGENCY MEDICINE

## 2017-05-02 PROCEDURE — 85025 COMPLETE CBC W/AUTO DIFF WBC: CPT

## 2017-05-02 PROCEDURE — 81000 URINALYSIS NONAUTO W/SCOPE: CPT

## 2017-05-02 PROCEDURE — 63600175 PHARM REV CODE 636 W HCPCS: Performed by: EMERGENCY MEDICINE

## 2017-05-02 PROCEDURE — 96372 THER/PROPH/DIAG INJ SC/IM: CPT

## 2017-05-02 PROCEDURE — 84702 CHORIONIC GONADOTROPIN TEST: CPT

## 2017-05-02 PROCEDURE — 81025 URINE PREGNANCY TEST: CPT | Performed by: EMERGENCY MEDICINE

## 2017-05-02 PROCEDURE — 80053 COMPREHEN METABOLIC PANEL: CPT

## 2017-05-02 PROCEDURE — 86900 BLOOD TYPING SEROLOGIC ABO: CPT

## 2017-05-02 PROCEDURE — 99285 EMERGENCY DEPT VISIT HI MDM: CPT | Mod: 25

## 2017-05-02 RX ORDER — ONDANSETRON 8 MG/1
8 TABLET, ORALLY DISINTEGRATING ORAL
Status: COMPLETED | OUTPATIENT
Start: 2017-05-02 | End: 2017-05-02

## 2017-05-02 RX ORDER — HYDROCODONE BITARTRATE AND ACETAMINOPHEN 5; 325 MG/1; MG/1
1 TABLET ORAL EVERY 4 HOURS PRN
Qty: 15 TABLET | Refills: 0 | Status: SHIPPED | OUTPATIENT
Start: 2017-05-02 | End: 2017-05-12

## 2017-05-02 RX ORDER — HYDROMORPHONE HYDROCHLORIDE 2 MG/ML
1 INJECTION, SOLUTION INTRAMUSCULAR; INTRAVENOUS; SUBCUTANEOUS
Status: COMPLETED | OUTPATIENT
Start: 2017-05-02 | End: 2017-05-02

## 2017-05-02 RX ADMIN — HYDROMORPHONE HYDROCHLORIDE 1 MG: 2 INJECTION INTRAMUSCULAR; INTRAVENOUS; SUBCUTANEOUS at 03:05

## 2017-05-02 RX ADMIN — ONDANSETRON 8 MG: 8 TABLET, ORALLY DISINTEGRATING ORAL at 03:05

## 2017-05-02 NOTE — ED PROVIDER NOTES
"Encounter Date: 2017    SCRIBE #1 NOTE: I, Rocio Watkins, am scribing for, and in the presence of,  Matt Corbin MD. I have scribed the following portions of the note - Other sections scribed: HPI/ROS.       History     Chief Complaint   Patient presents with    Vaginal Bleeding     Patient states that she is 10 weeks pregnant. Patient states that she has been having some vaginal bleeding and cramping for about an hour.      Review of patient's allergies indicates:  No Known Allergies  HPI Comments: CC: Vaginal Bleeding    HPI: 31 y.o. Morbidly obese F with cervical dysplasia, H/O gonorrhea, IVC filter in place presents to the ED c/o vaginal bleeding that began earlier today but worsened about 5 hours PTA. Pt reports of a passing a clot "the size of my hand." Pt also began having severe abdominal pain and nausea 5 hours ago. Pt is 10 weeks pregnant. No hx of US for this pregnancy. Pt denies fever, CP, cough, emesis, and any other symptoms.    SHx: cholecystectomy, , D&C, gastric sleeve surgery, cervical lining biopsy, IVC filter placement, endoscopy     The history is provided by the patient.     Past Medical History:   Diagnosis Date    Cervical dysplasia     Gonorrhea     Morbid obesity      Past Surgical History:   Procedure Laterality Date    CERVICAL BIOPSY      CERVIX SURGERY       SECTION, CLASSIC      CHOLECYSTECTOMY           DILATION AND CURETTAGE OF UTERUS      GASTRECTOMY  2013    Sleeve    gastric sleeve  ..     Family History   Problem Relation Age of Onset    Diabetes Maternal Grandmother       of MI at 63     Heart disease Maternal Grandmother     Hypertension Maternal Grandmother     Stroke Maternal Grandmother     Obesity Maternal Grandmother     Obesity Mother       at 26 years of Gun shot wound    Diabetes Mother     Diabetes Father      Social History   Substance Use Topics    Smoking status: Never Smoker    Smokeless " tobacco: None    Alcohol use No     Review of Systems   Constitutional: Negative for chills and fever.   HENT: Negative for congestion and sore throat.    Eyes: Negative for pain.   Respiratory: Negative for cough and shortness of breath.    Cardiovascular: Negative for chest pain.   Gastrointestinal: Positive for abdominal pain and nausea. Negative for diarrhea and vomiting.   Genitourinary: Positive for vaginal bleeding. Negative for dysuria.   Musculoskeletal: Negative for neck pain.   Skin: Negative for rash.   Neurological: Negative for headaches.       Physical Exam   Initial Vitals   BP Pulse Resp Temp SpO2   05/02/17 0024 05/02/17 0024 05/02/17 0024 05/02/17 0024 05/02/17 0024   124/80 88 16 98 °F (36.7 °C) 100 %     Physical Exam  The patient was examined specifically for the following:   General:No significant distress, Good color, Warm and dry. Head and neck:Scalp atraumatic, Neck supple. Neurological:Appropriate conversation, Gross motor deficits. Eyes:Conjugate gaze, Clear corneas. ENT: No epistaxis. Cardiac: Regular rate and rhythm, Grossly normal heart tones. Pulmonary: Wheezing, Rales. Gastrointestinal: Abdominal tenderness, Abdominal distention. Musculoskeletal: Extremity deformity, Apparent pain with range of motion of the joints. Skin: Rash.   The findings on examination were normal except for the following: The patient has bilateral low pelvic tenderness.  There is no real guarding.  The patient is morbidly obese.  The lungs are clear.  The heart tones are normal.  The abdomen is soft.  Pelvic examination reveals a fairly small uterus.  There is a cervical os that is open about 0.5 cm.  There are blood clots at the cervical os.  I could find no tissue.  There was vague diffuse pelvic tenderness.  ED Course   Procedures  Labs Reviewed   CBC W/ AUTO DIFFERENTIAL - Abnormal; Notable for the following:        Result Value    Lymph% 49.1 (*)     All other components within normal limits    COMPREHENSIVE METABOLIC PANEL - Abnormal; Notable for the following:     Alkaline Phosphatase 38 (*)     ALT 8 (*)     All other components within normal limits   URINALYSIS - Abnormal; Notable for the following:     Color, UA Red (*)     Protein, UA 2+ (*)     Glucose, UA 1+ (*)     Occult Blood UA 2+ (*)     All other components within normal limits   URINALYSIS MICROSCOPIC - Abnormal; Notable for the following:     RBC, UA >100 (*)     Bacteria, UA Many (*)     All other components within normal limits   POCT URINE PREGNANCY - Abnormal; Notable for the following:     POC Preg Test, Ur Positive (*)     All other components within normal limits   HCG, QUANTITATIVE, PREGNANCY   GROUP & RH          X-Rays:   Independently Interpreted Readings:   Other Readings:  Ultrasound of the pelvis fails to reveal any intrauterine pregnancy or an ectopic pregnancy.    Medical decision making: Given the above, this patient presents emergency with crampy bilateral low abdominal pain.  Her quantitative hCG is 12,000.  Ultrasound does not reveal any intrauterine or extrauterine pregnancy.  The patient has a blood clot in her cervical os.  She has moderate vague diffuse low pelvic tenderness.  There is no free fluid in the abdomen.  No ectopic pregnancy was identified.  I discussed this case with Dr. Rodriguez, on call for Dr. Coates who was confident that this was a spontaneous  does not feel that the patient requires any further evaluation and treatment in the emergency room tonight she asked that the patient be discharged to follow-up in the office tomorrow accordingly this will be done.  The patient is hemodynamically stable.  She is O+.  She does not require RhoGAM.             Scribe Attestation:   Scribe #1: I performed the above scribed service and the documentation accurately describes the services I performed. I attest to the accuracy of the note.    Attending Attestation:           Physician Attestation for  Scribe:  Physician Attestation Statement for Scribe #1: I, Matt Corbin MD, reviewed documentation, as scribed by Rocio Watkins in my presence, and it is both accurate and complete.                 ED Course     Clinical Impression:   The primary encounter diagnosis was Spontaneous  at 8 to 28 weeks gestation. Diagnoses of Pelvic pain in female and Vaginal bleeding before 22 weeks gestation were also pertinent to this visit.          Matt Corbin MD  17 0412

## 2017-05-02 NOTE — ED AVS SNAPSHOT
[facility]  [address] [place] [address]               Trisha Ferguson   2017  2:16 AM   ED    Description:  Female : 1985   Department:  [facility]           Your Care was Coordinated By:     Provider Role From To    Matt Corbin MD Attending Provider 17 0220 --      Reason for Visit     Vaginal Bleeding           Diagnoses this Visit        Comments    Spontaneous  at 8 to 28 weeks gestation    -  Primary     Pelvic pain in female         Vaginal bleeding before 22 weeks gestation           ED Disposition     None           To Do List           Follow-up Information     Follow up with OLGA Coates MD In 1 day.    Specialty:  Obstetrics    Why:  Please see your doctor for an evaluation in the office today.    Contact information:    [address] [address] [phone]         These Medications        Disp Refills Start End    hydrocodone-acetaminophen 5-325mg (NORCO) 5-325 mg per tablet 15 tablet 0 2017    Take 1 tablet by mouth every 4 (four) hours as needed for Pain. - Oral    Pharmacy: [id number] - [facility] [address] Ph #: [phone]         [facility] On [facility]     [facility] Nurse Care Line -  Assistance  Unless otherwise directed by your provider, please contact [facility] On-[facility], our nurse care line that is available for  assistance.     Registered nurses in the [facility] provide: appointment scheduling, clinical advisement, health education, and other advisory services.  Call: [phone] (toll free)               Medications           Message regarding Medications     Verify the changes and/or additions to your medication regime listed below are the same as discussed with your clinician today.  If any of these changes or additions are incorrect, please notify your healthcare provider.        START taking these NEW  medications        Refills    hydrocodone-acetaminophen 5-325mg (NORCO) 5-325 mg per tablet 0    Sig: Take 1 tablet by mouth every 4 (four) hours as needed for Pain.    Class: Print    Route: Oral      These medications were administered today        Dose Freq    hydromorphone (PF) injection 1 mg 1 mg ED 1 Time    Sig: Inject 0.5 mLs (1 mg total) into the muscle ED 1 Time.    Class: Normal    Route: Intramuscular    ondansetron disintegrating tablet 8 mg 8 mg ED 1 Time    Sig: Take 1 tablet (8 mg total) by mouth ED 1 Time.    Class: Normal    Route: Oral      STOP taking these medications     clotrimazole-betamethasone 1-0.05% (LOTRISONE) cream Apply topically 2 (two) times daily.    ergocalciferol (ERGOCALCIFEROL) 50,000 unit Cap Take 1 capsule (50,000 Units total) by mouth twice a week.    nystatin (MYCOSTATIN) powder Apply topically 2 (two) times daily.    omeprazole (PRILOSEC) 40 MG capsule Take 1 capsule (40 mg total) by mouth every morning.    cyanocobalamin, vitamin B-12, (VITAMIN B-12) 1,000 mcg Subl Place under the tongue.    multivitamin (ONE DAILY MULTIVITAMIN) per tablet Take 1 tablet by mouth once daily.           Verify that the below list of medications is an accurate representation of the medications you are currently taking.  If none reported, the list may be blank. If incorrect, please contact your healthcare provider. Carry this list with you in case of emergency.           Current Medications     PRENATAL VIT/IRON FUMARATE/FA (PRENATAL 1+1 ORAL) Take by mouth.    hydrocodone-acetaminophen 5-325mg (NORCO) 5-325 mg per tablet Take 1 tablet by mouth every 4 (four) hours as needed for Pain.    hydromorphone (PF) injection 1 mg Inject 0.5 mLs (1 mg total) into the muscle ED 1 Time.    ondansetron disintegrating tablet 8 mg Take 1 tablet (8 mg total) by mouth ED 1 Time.           Clinical Reference Information           Your Vitals Were     BP Pulse Temp Resp Height Weight    124/80 (BP Location: Left  "arm, Patient Position: Sitting) 88 98 °F (36.7 °C) (Oral) 16 4' 11" (1.499 m) 120.2 kg (265 lb)    Last Period SpO2 BMI          02/13/2017 100% 53.52 kg/m2        Allergies as of 5/2/2017     No Known Allergies      Immunizations Administered on Date of Encounter - 5/2/2017     None      ED Micro, Lab, POCT     Start Ordered       Status Ordering Provider    05/02/17 0030 05/02/17 0029  CBC W/ AUTO DIFFERENTIAL  STAT      Final result     05/02/17 0030 05/02/17 0029  Comp. Metabolic Panel  STAT      Final result     05/02/17 0030 05/02/17 0029  hCG, quantitative  STAT      Final result     05/02/17 0030 05/02/17 0029  Urinalysis  STAT      In process     05/02/17 0029 05/02/17 0029  Urinalysis Microscopic  Once      In process     05/02/17 0027 05/02/17 0027  POCT urine pregnancy  Once      Final result       ED Imaging Orders     Start Ordered       Status Ordering Provider    05/02/17 0030 05/02/17 0029  US OB Less Than 14 Wks with Transvag(xpd  1 time imaging      Final result         Discharge Instructions       Please return immediately if you get worse or if new problems develop.  Please follow-up with your OB/GYN doctor today in the office.  [facility].     [facility] complies with applicable Federal civil rights laws and does not discriminate on the basis of race, color, national origin, age, disability, or sex.        Language Assistance Services     ATTENTION: Language assistance services are available, free of charge. Please call [phone].      ATENCIÓN: Si habla español, tiene a ye disposición servicios gratuitos de asistencia lingüística. Llame al [phone].     CHÚ Ý: N?u b?n nói Ti?ng Vi?t, có các d?ch v? h? tr? ngôn ng? mi?n phí dành cho b?n. G?i s? [phone].        "

## 2017-05-02 NOTE — ED TRIAGE NOTES
Pt reports to ED with c/o lower abdominal/pelvic pain/cramping, moderate vaginal bleeding starting, and nausea about 4 hours ago; pt is currently 10 weeks pregnant; pt states that this pain is the worse pain she has ever had; pt bent over the side of the bed in pain; pt AAOx4; will continue to monitor.

## 2017-05-02 NOTE — DISCHARGE INSTRUCTIONS
Please return immediately if you get worse or if new problems develop.  Please follow-up with your OB/GYN doctor today in the office.  Rest.

## 2017-05-11 ENCOUNTER — PATIENT MESSAGE (OUTPATIENT)
Dept: BARIATRICS | Facility: CLINIC | Age: 32
End: 2017-05-11

## 2017-05-19 ENCOUNTER — PATIENT MESSAGE (OUTPATIENT)
Dept: BARIATRICS | Facility: CLINIC | Age: 32
End: 2017-05-19

## 2017-05-19 NOTE — PROGRESS NOTES
TT PT and she had a miscarriage and would like to come back in and restart weight loss s/p sleeve.  Scheduled appts for her

## 2017-07-17 ENCOUNTER — INITIAL CONSULT (OUTPATIENT)
Dept: BARIATRICS | Facility: CLINIC | Age: 32
End: 2017-07-17
Payer: MEDICAID

## 2017-07-17 VITALS
WEIGHT: 280.19 LBS | HEIGHT: 59 IN | DIASTOLIC BLOOD PRESSURE: 76 MMHG | SYSTOLIC BLOOD PRESSURE: 128 MMHG | HEART RATE: 82 BPM | BODY MASS INDEX: 56.48 KG/M2

## 2017-07-17 DIAGNOSIS — Z98.84 S/P LAPAROSCOPIC SLEEVE GASTRECTOMY: Primary | ICD-10-CM

## 2017-07-17 DIAGNOSIS — E66.01 MORBID OBESITY WITH BMI OF 50.0-59.9, ADULT: ICD-10-CM

## 2017-07-17 PROCEDURE — 99999 PR PBB SHADOW E&M-EST. PATIENT-LVL III: CPT | Mod: PBBFAC,,, | Performed by: INTERNAL MEDICINE

## 2017-07-17 PROCEDURE — 99213 OFFICE O/P EST LOW 20 MIN: CPT | Mod: PBBFAC | Performed by: INTERNAL MEDICINE

## 2017-07-17 PROCEDURE — 99215 OFFICE O/P EST HI 40 MIN: CPT | Mod: S$PBB,,, | Performed by: INTERNAL MEDICINE

## 2017-07-17 RX ORDER — DIETHYLPROPION HYDROCHLORIDE 25 MG/1
1 TABLET ORAL 2 TIMES DAILY PRN
Qty: 60 EACH | Refills: 1 | Status: SHIPPED | OUTPATIENT
Start: 2017-07-17 | End: 2017-09-22

## 2017-07-17 NOTE — PROGRESS NOTES
Subjective:       Patient ID: Trisha Ferguson is a 31 y.o. female.    Chief Complaint: Consult    Current attempts at weight loss:follow up of Sleeve Gastrectomy on 11/26/2013 with Dr. Anderson.  She is tolerating the diet without difficulty.  Her lowest weight after surgery was 202 lbs.  She has regained about 45 lbs after a scare of her possibly having Uterine Cancer.  She does not have cancer.  She is finding it difficult to get the weight off, especially since the excess skin in her abdomen and thighs are making it difficulty to exercise  She has gained 40 + lbs since she was last seen here. She has gained weight since she has had 2 miscarriages recently. She is going to avoid pregnancy for the next year. She is not exercising currently.. She is exercising 2 times a week currently. She was working out 2 hours a day at one point.  She has tried going to liquid diet again.      Patient Active Problem List:     Morbid obesity     Numbness of leg     Pain in limb     S/P laparoscopic sleeve gastrectomy     Dermatitis     Elevated LFTs     Morbid obesity with BMI of 45.0-49.9, adult       Previous diet attempts: The patient has tried [facility] Diet, Adipex, exercise, low carbohydrate with high protein, no meat dieting, and cabbage soup diet.  She reports that she was able to lose about 100 pounds with Adipex, high protein and low carbohydrate in 2008.    Heaviest weight: 378 before surgery.       History of medication for loss:  Phentermine before surgery. Doid have some heart racing.         Typical eating patterns: Uses at least 1 shake a day.   Breakfast:   Egg. Shake. Bowl of cereal.    Lunch: Salad- lettuce, tomato, cucumber, egg and cheese and raspberry vinegarette.     Dinner: chicken, or turkey meatloaf with salad, green beans or corn.     Snacks: Chips, frozen yogurt, SF popsicles,     Beverages: Water and coffee with splenda.     Willingness to change: 10/10    EKG: Normal sinus rhythm  Normal ECG  Since  "previous tracing No significant change was found  Confirmed by fellow Jane WU (Unofficial Report), Thanh ([id number]) on  12/2/2013 9:40:56 AM            Review of Systems   Constitutional: Negative for chills and fever.   Respiratory: Negative for apnea and shortness of breath.    Cardiovascular: Negative for chest pain and leg swelling.   Gastrointestinal: Negative for constipation and diarrhea.        Denies GERD   Genitourinary: Negative for difficulty urinating and menstrual problem.   Musculoskeletal: Negative for arthralgias and back pain.   Neurological: Negative for dizziness and light-headedness.   Psychiatric/Behavioral: Negative for dysphoric mood. The patient is not nervous/anxious.        Objective:     /76   Pulse 82   Ht 4' 11" (1.499 m)   Wt 127.1 kg (280 lb 3.3 oz)   LMP 02/13/2017   BMI 56.59 kg/m²     Physical Exam   Constitutional: She is oriented to person, place, and time. She appears well-developed and well-nourished. No distress.   HENT:   Head: Normocephalic and atraumatic.   Mouth/Throat: No oropharyngeal exudate.   Eyes: EOM are normal. Pupils are equal, round, and reactive to light. No scleral icterus.   Neck: Normal range of motion. Neck supple. No thyromegaly present.   Cardiovascular: Normal rate and normal heart sounds.  Exam reveals no gallop and no friction rub.    No murmur heard.  Pulmonary/Chest: Effort normal and breath sounds normal. No respiratory distress. She has no wheezes.   Abdominal: Soft. Bowel sounds are normal. She exhibits no distension. There is no tenderness.   Musculoskeletal: Normal range of motion. She exhibits no edema.   Lymphadenopathy:     She has no cervical adenopathy.   Neurological: She is alert and oriented to person, place, and time. No cranial nerve deficit.   Skin: Skin is warm and dry. No erythema.   Psychiatric: She has a normal mood and affect. Her behavior is normal. Judgment normal.   Vitals reviewed.      Assessment:       1. S/P " laparoscopic sleeve gastrectomy    2. Morbid obesity with BMI of 50.0-59.9, adult        Plan:         1. S/P laparoscopic sleeve gastrectomy  Modified liquid diet instruction given.     2. Morbid obesity with BMI of 50.0-59.9, adult  Patient warned of common side effects of diethylpropion including anxiety, insomnia, palpitations and increased blood pressure. It was also explained that it is for short-term usage along with diet and exercise, and that stopping the medication without making lifestyle changes will result in regain of weight. Patient states understanding.    Weight loss medications are controlled substances.  They require routine follow up. Prescription or pills that are lost or destroyed will not be replaced.       Food log/tracker. 800-1000 av a day.  g protein.     Increase exercise to 4 days a week this month.

## 2017-07-17 NOTE — LETTER
July 18, 2017      Veronika Pugh PA-C  [address]  [facility] [address]           [facility] - Bariatric Surgery  [address]  [facility] [address]  Phone: [phone]  Fax: [phone]          Patient: Trisha Ferguson   MR Number: [id number]   YOB: 1985   Date of Visit: 7/17/2017       Dear Veronika Pugh:    Thank you for referring Trisha Ferguson to me for evaluation. Attached you will find relevant portions of my assessment and plan of care.    If you have questions, please do not hesitate to call me. I look forward to following Trisha Ferguson along with you.    Sincerely,    Sigrid Joe MD    Enclosure  CC:  No Recipients    If you would like to receive this communication electronically, please contact [email or web address] or [phone] to request more information on [employer] access.    For providers and/or their staff who would like to refer a patient to [facility], please contact us through our one-stop-shop provider referral line, [facility], at [phone].    If you feel you have received this communication in error or would no longer like to receive these types of communications, please e-mail [email or web address]

## 2017-07-17 NOTE — PATIENT INSTRUCTIONS
Patient warned of common side effects of diethylpropion including anxiety, insomnia, palpitations and increased blood pressure. It was also explained that it is for short-term usage along with diet and exercise, and that stopping the medication without making lifestyle changes will result in regain of weight. Patient states understanding.    Weight loss medications are controlled substances.  They require routine follow up. Prescription or pills that are lost or destroyed will not be replaced.       Food log/tracker. 800-1000 av a day.  g protein.     Increase exercise to 4 days a week this month.       MODIFIED LIQUID DIET    1. Follow Liquid Diet for _7_ days (<4 g sugar/serv protein shakes, s/f clear liquids).     2. After ___7_ days, you will drink 2 protein shakes, 2 high protein snacks and 1 high protein meals daily (for a total of 5 small meals daily).     3. Slowly progress to your regular bariatric diet as you feel comfortable. You may want to incorporate 1 protein shake daily to ensure you reach 80 g of protein daily.     Bariatric diet:    - No sweets and high fat foods.    - Remember your 80 grams PROTEIN!    - Do not drink with meals. Wait at least 30 minutes after meals to start drinking.      EAT THESE FOODS    High in Protein:     Canned tuna or chicken (packed in water)    Lean ground turkey breast or ground round    Turkey or chicken (no skin); cooked tender and cut in small pieces    Lean pork or beef (cook in crock pot until very tender; cut in small pieces    Scrambled, poached, or boiled eggs    Baked, broiled, grilled or boiled fish and seafood (not fried!)    Silken tofu     Beans and lentils    Lean deli meats (turkey and chicken breast, ham, roast beef)    1% or Skim Milk, Lactaid, or Soymilk    Low-fat or fat-free cottage cheese, soft cheese, mozzarella string cheese, or ricotta    Light yogurt, Greek yogurt, SF pudding    Fresh fruits, frozen fruits without sugar,  "fruits canned in own juice   Fresh vegetables, frozen vegetables and vegetables canned with no added sodium                     AVOID ALL ITEMS BELOW     White and wheat Bread, Rice including brown rice, fried rice, Pasta including whole wheat pasta  Cereals (including grits, oatmeal)   Crackers, Pretzels, Chips   Corn, Popcorn, Peas   White Potatoes, Sweet potatoes including mashed potatoes  Flour and corn tortillas or any other tortillas    High fat milk (whole, 2%)   Butter, margarine, oil, mayonnaise   Sour cream, cream cheese, salad dressing   Ice Cream including sugar free ice cream   Cakes, cookies, pies, desserts and sugar free desserts  Candy   Luncheon meats (bologna, salami, chopped ham)   Sausage, Martinez   Gravy   Fried Foods or Breaded foods  ___________________________________     USE ONLY I CANT BELIEVE ITS NOT BUTTER SPRAY OR EMANUEL SPRAYS FOR COOKING    Can use lite bal, fat-free sour cream or fat-free cream cheese  Can use turkey martinez and turkey sausage        - For coffee lovers you can add protein powder or protein shakes to coffee.  Add 1 Vanilla Premier Protein Drink to a Shot of Cool Brew Coffee with or without some SF Torani Syrup.  There are some Protein Coffee drinks/mixes on the market, as listed below.    - JAVAPRO:  Protein Coffee, 20 grams of protein with 1 g of sugar.  From www.[employer] or www.[facility] or [employer].  (Caramel, Espresso, Sami Vanilla, Hazelnut, Latte, & Mocha flavors).  Ready to drink at [employer] online and only Mocha flavor at [employer].  All flavors/products from [employer].            - Syntrax Nectar:  Hot or cold.  Also has "Grab n Go" individual packs.  www.[facility], [employer]          - BuzzFit Protein Coffee:  Hot or cold.  10 g of protein with 0 g sugar.  From www.amazon.com        - Rusty Protein Coffee:  Iced Coffee, Jar or Single Packs.  20 grams of protein with 3 g of sugar. Mocha and Regular Flavor.  From " www.[employer] or wwwSellfSpanish [facility]ork [facility]

## 2017-07-21 ENCOUNTER — PATIENT MESSAGE (OUTPATIENT)
Dept: BARIATRICS | Facility: CLINIC | Age: 32
End: 2017-07-21

## 2017-09-22 ENCOUNTER — HOSPITAL ENCOUNTER (EMERGENCY)
Facility: HOSPITAL | Age: 32
Discharge: HOME | End: 2017-09-22
Attending: EMERGENCY MEDICINE
Payer: MEDICAID

## 2017-09-22 VITALS
RESPIRATION RATE: 18 BRPM | TEMPERATURE: 98 F | SYSTOLIC BLOOD PRESSURE: 146 MMHG | BODY MASS INDEX: 60.19 KG/M2 | OXYGEN SATURATION: 98 % | WEIGHT: 279 LBS | DIASTOLIC BLOOD PRESSURE: 84 MMHG | HEIGHT: 57 IN | HEART RATE: 69 BPM

## 2017-09-22 DIAGNOSIS — R10.31 RLQ ABDOMINAL PAIN: ICD-10-CM

## 2017-09-22 DIAGNOSIS — R10.2 ACUTE SUPRAPUBIC PAIN: ICD-10-CM

## 2017-09-22 DIAGNOSIS — M54.30 SCIATIC LEG PAIN: Primary | ICD-10-CM

## 2017-09-22 LAB
B-HCG UR QL: NEGATIVE
BACTERIA GENITAL QL WET PREP: ABNORMAL
BILIRUB UR QL STRIP: NEGATIVE
C TRACH DNA SPEC QL NAA+PROBE: NOT DETECTED
CLARITY UR: CLEAR
CLUE CELLS VAG QL WET PREP: ABNORMAL
COLOR UR: YELLOW
CTP QC/QA: YES
FILAMENT FUNGI VAG WET PREP-#/AREA: ABNORMAL
GLUCOSE UR QL STRIP: NEGATIVE
HGB UR QL STRIP: ABNORMAL
KETONES UR QL STRIP: NEGATIVE
LEUKOCYTE ESTERASE UR QL STRIP: NEGATIVE
MICROSCOPIC COMMENT: NORMAL
N GONORRHOEA DNA SPEC QL NAA+PROBE: NOT DETECTED
NITRITE UR QL STRIP: NEGATIVE
PH UR STRIP: 5 [PH] (ref 5–8)
PROT UR QL STRIP: NEGATIVE
RBC #/AREA URNS HPF: 4 /HPF (ref 0–4)
SP GR UR STRIP: 1.01 (ref 1–1.03)
SPECIMEN SOURCE: ABNORMAL
SQUAMOUS #/AREA URNS HPF: NORMAL /HPF
T VAGINALIS GENITAL QL WET PREP: ABNORMAL
URN SPEC COLLECT METH UR: ABNORMAL
UROBILINOGEN UR STRIP-ACNC: NEGATIVE EU/DL
WBC #/AREA URNS HPF: 2 /HPF (ref 0–5)
WBC #/AREA VAG WET PREP: ABNORMAL
YEAST GENITAL QL WET PREP: ABNORMAL

## 2017-09-22 PROCEDURE — 87210 SMEAR WET MOUNT SALINE/INK: CPT

## 2017-09-22 PROCEDURE — 99284 EMERGENCY DEPT VISIT MOD MDM: CPT | Mod: 25

## 2017-09-22 PROCEDURE — 81025 URINE PREGNANCY TEST: CPT | Performed by: EMERGENCY MEDICINE

## 2017-09-22 PROCEDURE — 63600175 PHARM REV CODE 636 W HCPCS: Performed by: NURSE PRACTITIONER

## 2017-09-22 PROCEDURE — 87591 N.GONORRHOEAE DNA AMP PROB: CPT

## 2017-09-22 PROCEDURE — 96372 THER/PROPH/DIAG INJ SC/IM: CPT

## 2017-09-22 PROCEDURE — 81000 URINALYSIS NONAUTO W/SCOPE: CPT

## 2017-09-22 PROCEDURE — 25000003 PHARM REV CODE 250: Performed by: NURSE PRACTITIONER

## 2017-09-22 RX ORDER — ACETAMINOPHEN 325 MG/1
650 TABLET ORAL EVERY 6 HOURS PRN
Qty: 60 TABLET | Refills: 0 | Status: SHIPPED | OUTPATIENT
Start: 2017-09-22 | End: 2019-05-09

## 2017-09-22 RX ORDER — KETOROLAC TROMETHAMINE 30 MG/ML
30 INJECTION, SOLUTION INTRAMUSCULAR; INTRAVENOUS
Status: COMPLETED | OUTPATIENT
Start: 2017-09-22 | End: 2017-09-22

## 2017-09-22 RX ORDER — IBUPROFEN 800 MG/1
800 TABLET ORAL EVERY 8 HOURS PRN
Qty: 30 TABLET | Refills: 0 | Status: SHIPPED | OUTPATIENT
Start: 2017-09-22 | End: 2017-09-22 | Stop reason: ALTCHOICE

## 2017-09-22 RX ORDER — DICYCLOMINE HYDROCHLORIDE 20 MG/1
20 TABLET ORAL 2 TIMES DAILY
Qty: 20 TABLET | Refills: 0 | Status: SHIPPED | OUTPATIENT
Start: 2017-09-22 | End: 2017-09-22

## 2017-09-22 RX ORDER — DICYCLOMINE HYDROCHLORIDE 20 MG/1
20 TABLET ORAL 2 TIMES DAILY
Qty: 20 TABLET | Refills: 0 | Status: SHIPPED | OUTPATIENT
Start: 2017-09-22 | End: 2017-09-22 | Stop reason: ALTCHOICE

## 2017-09-22 RX ORDER — DICYCLOMINE HYDROCHLORIDE 20 MG/1
20 TABLET ORAL 2 TIMES DAILY
Qty: 20 TABLET | Refills: 0 | Status: SHIPPED | OUTPATIENT
Start: 2017-09-22 | End: 2017-10-22

## 2017-09-22 RX ORDER — DICYCLOMINE HYDROCHLORIDE 10 MG/1
20 CAPSULE ORAL
Status: COMPLETED | OUTPATIENT
Start: 2017-09-22 | End: 2017-09-22

## 2017-09-22 RX ADMIN — KETOROLAC TROMETHAMINE 30 MG: 30 INJECTION, SOLUTION INTRAMUSCULAR at 12:09

## 2017-09-22 RX ADMIN — DICYCLOMINE HYDROCHLORIDE 20 MG: 10 CAPSULE ORAL at 12:09

## 2017-09-22 NOTE — ED TRIAGE NOTES
Right lower quadrant and groin pain since yesterday with pain down right leg no fever or n/v/d sharp pain to groin also lower back pain

## 2017-09-22 NOTE — ED PROVIDER NOTES
"Encounter Date: 2017    SCRIBE #1 NOTE: I, Rose Mary Webster, am scribing for, and in the presence of,  America Hazel NP. I have scribed the following portions of the note - Other sections scribed: HPI and ROS.       History     Chief Complaint   Patient presents with    Abdominal Pain     RLQ pain and groin pain radiating down right leg started yesterday evening; denies N/V/D; denies pregnancy    Groin Pain     CC: Groin Pain    HPI: This 32 y.o. female with medical history of cervical dysplasia, morbid obesity and gonorrhea presents to the ED c/o right sided groin pain. Pt reports that she began to experience a "very sharp" pain to the right groin upon standing up yesterday evening. She notes that she also began to experience pain to the right lower back (near the right buttocks) radiating into the right leg. Pt states that she was evaluated at an Urgent Care facility this morning for the same symptoms and was sent to the ED to rule out issues with the appendix. Symptoms are acute, constant and severe (8/10). Pt notes that she was placed on an IVC filter after undergoing a gastric sleeve procedure to prevent blood clots. She also c/o "white" vaginal discharge for the last 3x days. Pt denies fever, emesis, diarrhea, dysuria and concerns for STD's. No other associated symptoms.       The history is provided by the patient. No  was used.     Review of patient's allergies indicates:  No Known Allergies  Past Medical History:   Diagnosis Date    Cervical dysplasia     Gonorrhea     Morbid obesity      Past Surgical History:   Procedure Laterality Date    CERVICAL BIOPSY      CERVIX SURGERY       SECTION, CLASSIC      CHOLECYSTECTOMY           DILATION AND CURETTAGE OF UTERUS      GASTRECTOMY  2013    Sleeve    gastric sleeve  11.25.13    icd filter       Family History   Problem Relation Age of Onset    Diabetes Maternal Grandmother       of MI at 63  "    Heart disease Maternal Grandmother     Hypertension Maternal Grandmother     Stroke Maternal Grandmother     Obesity Maternal Grandmother     Obesity Mother       at 26 years of Gun shot wound    Diabetes Mother     Diabetes Father      Social History   Substance Use Topics    Smoking status: Never Smoker    Smokeless tobacco: Never Used    Alcohol use No     Review of Systems   Constitutional: Negative for chills and fever.   HENT: Negative for congestion, ear pain, rhinorrhea and sore throat.    Eyes: Negative for pain and visual disturbance.   Respiratory: Negative for cough and shortness of breath.    Cardiovascular: Negative for chest pain.   Gastrointestinal: Negative for abdominal pain, diarrhea, nausea and vomiting.   Genitourinary: Positive for vaginal discharge. Negative for dysuria.        (+) right groin pain   Musculoskeletal: Positive for back pain (right lower (near the right buttock); radiating into the right leg). Negative for neck pain.   Skin: Negative for rash.   Neurological: Negative for headaches.       Physical Exam     Initial Vitals [17 1003]   BP Pulse Resp Temp SpO2   (!) 148/72 76 20 98.5 °F (36.9 °C) 98 %      MAP       97.33         Physical Exam    Nursing note and vitals reviewed.  Constitutional: She appears well-developed.   Morbidly obese   HENT:   Head: Normocephalic.   Mouth/Throat: Oropharynx is clear and moist.   Eyes: Conjunctivae are normal.   Neck: Normal range of motion. Neck supple.   Cardiovascular: Normal rate, regular rhythm and normal heart sounds.   Pulmonary/Chest: Breath sounds normal.   Abdominal: Soft. Bowel sounds are normal. There is no tenderness.   Patient has suprapubic tenderness and mild tenderness to right inguinal area.  There is no hernia appreciated, no lymph nodes, no erythema, no ecchymosis.   Genitourinary: Uterus normal. No vaginal discharge found.   Genitourinary Comments: There is a small amount of thick white discharge  present in the vault.   Musculoskeletal: Normal range of motion.   Neurological: She is alert and oriented to person, place, and time.   Skin: Skin is warm and dry. Capillary refill takes less than 2 seconds.         ED Course   Procedures  Labs Reviewed   VAGINAL SCREEN - Abnormal; Notable for the following:        Result Value    Bacteria - Vaginal Screen Few (*)     All other components within normal limits   URINALYSIS - Abnormal; Notable for the following:     Occult Blood UA 2+ (*)     All other components within normal limits   C. TRACHOMATIS/N. GONORRHOEAE BY AMP DNA   URINALYSIS MICROSCOPIC   POCT URINE PREGNANCY             Medical Decision Making:   Initial Assessment:   32-year-old female presents with suprapubic and right groin pain also complains of lower right back pain that radiates down over by.to right thigh.  Differential Diagnosis:   Sciatica  UTI  PID  ED Management:  Diagnosis management comments: This is an urgent evaluation of a 32-year-old female  that presented to the ER with c/o right-sided lower back pain and suprapubic pain. Pts exam was as above.  Patient reports being sent from urgent care due to concerns of her IVC filter migrating.     xrays were reviewed and discussed with pt to include placement of IVC filter.  Patient given Toradol and Bentyl and states she is feeling better.    Based on exam today I believe there are 2 separate issues going on she has sciatica over the right lower back into her leg and then the suprapubic/right inguinal pain.  Patient's urine and pelvic exam was benign therefore I am not sure where her suprapubic pain is originating from.  However I do not believe its of an emergent nature at this time.  I have discussed this with patient to include red flags of when to return to the emergency department.    Pt verbalizes understanding of d/c instructions and will return for worsening condition.    Case discussed with attending who, also evaluated patient, and  agrees with assessment and plan.               Scribe Attestation:   Scribe #1: I performed the above scribed service and the documentation accurately describes the services I performed. I attest to the accuracy of the note.    Attending Attestation:           Physician Attestation for Scribe:  Physician Attestation Statement for Scribe #1: I, America Hazel NP, reviewed documentation, as scribed by Rose Mary Webster in my presence, and it is both accurate and complete.                 ED Course      Clinical Impression:   The primary encounter diagnosis was Sciatic leg pain. Diagnoses of RLQ abdominal pain and Acute suprapubic pain were also pertinent to this visit.    Disposition:   Disposition: Discharged  Condition: Stable                        America Hazel NP  09/22/17 [id number]

## 2017-11-10 ENCOUNTER — HOSPITAL ENCOUNTER (EMERGENCY)
Facility: HOSPITAL | Age: 32
Discharge: HOME | End: 2017-11-10
Attending: EMERGENCY MEDICINE
Payer: MEDICAID

## 2017-11-10 VITALS
SYSTOLIC BLOOD PRESSURE: 116 MMHG | OXYGEN SATURATION: 99 % | HEART RATE: 79 BPM | TEMPERATURE: 98 F | BODY MASS INDEX: 50.4 KG/M2 | WEIGHT: 250 LBS | HEIGHT: 59 IN | RESPIRATION RATE: 17 BRPM | DIASTOLIC BLOOD PRESSURE: 58 MMHG

## 2017-11-10 DIAGNOSIS — S29.011A PECTORALIS MUSCLE STRAIN, INITIAL ENCOUNTER: Primary | ICD-10-CM

## 2017-11-10 DIAGNOSIS — R07.9 CHEST PAIN: ICD-10-CM

## 2017-11-10 LAB
ALBUMIN SERPL BCP-MCNC: 3.5 G/DL
ALP SERPL-CCNC: 50 U/L
ALT SERPL W/O P-5'-P-CCNC: 12 U/L
ANION GAP SERPL CALC-SCNC: 7 MMOL/L
AST SERPL-CCNC: 17 U/L
B-HCG UR QL: NEGATIVE
BASOPHILS # BLD AUTO: 0.03 K/UL
BASOPHILS NFR BLD: 0.4 %
BILIRUB SERPL-MCNC: 0.5 MG/DL
BNP SERPL-MCNC: <10 PG/ML
BUN SERPL-MCNC: 15 MG/DL
CALCIUM SERPL-MCNC: 9 MG/DL
CHLORIDE SERPL-SCNC: 104 MMOL/L
CO2 SERPL-SCNC: 28 MMOL/L
CREAT SERPL-MCNC: 0.9 MG/DL
CTP QC/QA: YES
DIFFERENTIAL METHOD: NORMAL
EOSINOPHIL # BLD AUTO: 0 K/UL
EOSINOPHIL NFR BLD: 0.5 %
ERYTHROCYTE [DISTWIDTH] IN BLOOD BY AUTOMATED COUNT: 13.2 %
EST. GFR  (AFRICAN AMERICAN): >60 ML/MIN/1.73 M^2
EST. GFR  (NON AFRICAN AMERICAN): >60 ML/MIN/1.73 M^2
GLUCOSE SERPL-MCNC: 78 MG/DL
HCT VFR BLD AUTO: 39.8 %
HGB BLD-MCNC: 13.4 G/DL
LYMPHOCYTES # BLD AUTO: 3.6 K/UL
LYMPHOCYTES NFR BLD: 46.9 %
MCH RBC QN AUTO: 28.8 PG
MCHC RBC AUTO-ENTMCNC: 33.7 G/DL
MCV RBC AUTO: 86 FL
MONOCYTES # BLD AUTO: 0.6 K/UL
MONOCYTES NFR BLD: 8.3 %
NEUTROPHILS # BLD AUTO: 3.4 K/UL
NEUTROPHILS NFR BLD: 43.9 %
PLATELET # BLD AUTO: 327 K/UL
PMV BLD AUTO: 9.5 FL
POTASSIUM SERPL-SCNC: 3.9 MMOL/L
PROT SERPL-MCNC: 7.8 G/DL
RBC # BLD AUTO: 4.65 M/UL
SODIUM SERPL-SCNC: 139 MMOL/L
TROPONIN I SERPL DL<=0.01 NG/ML-MCNC: <0.006 NG/ML
WBC # BLD AUTO: 7.7 K/UL

## 2017-11-10 PROCEDURE — 83880 ASSAY OF NATRIURETIC PEPTIDE: CPT

## 2017-11-10 PROCEDURE — 93005 ELECTROCARDIOGRAM TRACING: CPT

## 2017-11-10 PROCEDURE — 93010 ELECTROCARDIOGRAM REPORT: CPT | Mod: ,,, | Performed by: INTERNAL MEDICINE

## 2017-11-10 PROCEDURE — 85025 COMPLETE CBC W/AUTO DIFF WBC: CPT

## 2017-11-10 PROCEDURE — 99284 EMERGENCY DEPT VISIT MOD MDM: CPT | Mod: 25

## 2017-11-10 PROCEDURE — 96374 THER/PROPH/DIAG INJ IV PUSH: CPT

## 2017-11-10 PROCEDURE — 84484 ASSAY OF TROPONIN QUANT: CPT

## 2017-11-10 PROCEDURE — 81025 URINE PREGNANCY TEST: CPT | Performed by: EMERGENCY MEDICINE

## 2017-11-10 PROCEDURE — 63600175 PHARM REV CODE 636 W HCPCS: Performed by: EMERGENCY MEDICINE

## 2017-11-10 PROCEDURE — 80053 COMPREHEN METABOLIC PANEL: CPT

## 2017-11-10 RX ORDER — ALUMINUM HYDROXIDE, MAGNESIUM HYDROXIDE, AND SIMETHICONE 2400; 240; 2400 MG/30ML; MG/30ML; MG/30ML
5 SUSPENSION ORAL EVERY 6 HOURS PRN
Qty: 335 ML | Refills: 0 | Status: SHIPPED | OUTPATIENT
Start: 2017-11-10 | End: 2018-07-13

## 2017-11-10 RX ORDER — IBUPROFEN 400 MG/1
400 TABLET ORAL EVERY 6 HOURS PRN
Qty: 20 TABLET | Refills: 0 | Status: SHIPPED | OUTPATIENT
Start: 2017-11-10 | End: 2018-07-13

## 2017-11-10 RX ORDER — KETOROLAC TROMETHAMINE 30 MG/ML
15 INJECTION, SOLUTION INTRAMUSCULAR; INTRAVENOUS
Status: COMPLETED | OUTPATIENT
Start: 2017-11-10 | End: 2017-11-10

## 2017-11-10 RX ADMIN — KETOROLAC TROMETHAMINE 15 MG: 30 INJECTION, SOLUTION INTRAMUSCULAR at 10:11

## 2017-11-11 NOTE — ED TRIAGE NOTES
33 yo female arrives to ED complaining of chest pain with SOB x 45 minutes. Denies nausea or any previous episodes. States that this has never happened before that is why she is concerned. Denies previous illness; was sitting down when tightness and palpitations started.

## 2017-11-11 NOTE — ED PROVIDER NOTES
Encounter Date: 11/10/2017    SCRIBE #1 NOTE: I, Ortiz Terry II, am scribing for, and in the presence of,  Lindsay Diaz MD. I have scribed the following portions of the note - Other sections scribed: HPI, ROS, PE.       History     Chief Complaint   Patient presents with    Chest Pain     left sided chest pain and tightness x 45 min that radiates to left shoulder/arm. pt states she was just sitting down at home when chest pain started. Pt denies cardiac hx and denies taking any medication prior to arrival. Pt c/o SOB     CC: Chest Pain     HPI: This 32 y.o. female with cervical dysplasia and morbid obesity presents to the ED c/o acute osnet, chest pain with SOB x1 hour. Pt states she was sitting at home when all of a sudden she began feeling localized left chest pain. Pt reports shortly after her chest pain subsided her SOB began. She denies any prior tx. No alleviating factors. Pain is exacerbated with palpation and movement of her left arm. She reports having an IVC filter in place to prevent blood clots post gastric sleeve. She also reports her grandmother  at 63 due a heart attack. Pt denies nausea, diaphoresis, and numbness.         The history is provided by the patient. No  was used.     Review of patient's allergies indicates:  No Known Allergies  Past Medical History:   Diagnosis Date    Cervical dysplasia     Gonorrhea     Morbid obesity      Past Surgical History:   Procedure Laterality Date    CERVICAL BIOPSY      CERVIX SURGERY       SECTION, CLASSIC      CHOLECYSTECTOMY           DILATION AND CURETTAGE OF UTERUS      GASTRECTOMY  2013    Sleeve    gastric sleeve  11.25.13    icd filter       Family History   Problem Relation Age of Onset    Diabetes Maternal Grandmother       of MI at 63     Heart disease Maternal Grandmother     Hypertension Maternal Grandmother     Stroke Maternal Grandmother     Obesity Maternal Grandmother      Obesity Mother       at 26 years of Gun shot wound    Diabetes Mother     Diabetes Father      Social History   Substance Use Topics    Smoking status: Never Smoker    Smokeless tobacco: Never Used    Alcohol use No     Review of Systems   Constitutional: Negative for chills and fever.   HENT: Negative for congestion, ear pain, rhinorrhea and sore throat.    Eyes: Negative for pain and visual disturbance.   Respiratory: Positive for shortness of breath. Negative for cough.    Cardiovascular: Positive for chest pain.   Gastrointestinal: Negative for abdominal pain, diarrhea, nausea and vomiting.   Genitourinary: Negative for dysuria.   Musculoskeletal: Negative for back pain and neck pain.   Skin: Negative for rash.   Neurological: Negative for headaches.       Physical Exam     Initial Vitals [11/10/17 2206]   BP Pulse Resp Temp SpO2   116/82 79 17 98.1 °F (36.7 °C) 99 %      MAP       93.33         Physical Exam    Nursing note and vitals reviewed.  Constitutional: She appears well-developed and well-nourished. She is Obese .  Non-toxic appearance. She does not appear ill.   HENT:   Head: Normocephalic and atraumatic.   Eyes: EOM are normal.   Neck: Neck supple.   Cardiovascular: Normal rate and regular rhythm.   Pulmonary/Chest: Effort normal and breath sounds normal. No respiratory distress. She exhibits tenderness.   reproducible tenderness to left pectoral       Abdominal: Soft. Normal appearance and bowel sounds are normal. She exhibits no distension. There is no tenderness.   Musculoskeletal: Normal range of motion.      Pain with resisted ROM of left upper extremity     Neurological: She is alert.   Skin: Skin is warm and dry.   Psychiatric: She has a normal mood and affect.         ED Course   Procedures  Labs Reviewed   COMPREHENSIVE METABOLIC PANEL - Abnormal; Notable for the following:        Result Value    Alkaline Phosphatase 50 (*)     Anion Gap 7 (*)     All other components within  normal limits   CBC W/ AUTO DIFFERENTIAL   TROPONIN I   B-TYPE NATRIURETIC PEPTIDE   POCT URINE PREGNANCY     EKG Readings: (Independently Interpreted)    NSR, nl axis, nl intervals, no definite acute ischemic changes        X-Rays:   Independently Interpreted Readings:   Chest X-Ray: No acute process. No CM, no infiltrate, no effusion, no PTX, no pulmonary edema.        Medical Decision Making:   History:   Old Medical Records: I decided to obtain old medical records.  Old Records Summarized: records from previous admission(s).       <> Summary of Records: Seen in ER 9/2017 for sciatica.  Initial Assessment:   32-year-old female presents with chest pain and shortness of breath.  She is not tachycardic or hypoxic.  Pain is reproducible with palpation and resisted range of motion.  Differential Diagnosis:   Muscle strain  Low clinical suspicion of ACS given lack of exertional or radiating symptoms  Low clinical suspicion of PE given lack of tachycardia, hypoxia, EKG changes, patient hasa IVC filter in place  Independently Interpreted Test(s):   I have ordered and independently interpreted X-rays - see prior notes.  I have ordered and independently interpreted EKG Reading(s) - see prior notes  Clinical Tests:   Lab Tests: Ordered and Reviewed  Radiological Study: Ordered and Reviewed  Medical Tests: Ordered and Reviewed  ED Management:  Patient afebrile and in no acute chest.  EKG does not show any acute ischemic changes.  Labs significant for negative troponin and no pneumonia.  Patient has had no episodes of hypoxia or tachycardia in ER.  Symptoms completely resolved with Toradol.  Symptoms most likely due to musculoskeletal strain. I have a low clinical suspicion of ACS or PE. she is stable for discharge and was given strict instructions return to ER for any worsening symptoms and referred her primary physician for follow-up.            Scribe Attestation:   Scribe #1: I performed the above scribed service and the  documentation accurately describes the services I performed. I attest to the accuracy of the note.    Attending Attestation:           Physician Attestation for Scribe:  Physician Attestation Statement for Scribe #1: I, Lindsay Diaz MD, reviewed documentation, as scribed by Ortiz Lazcano II in my presence, and it is both accurate and complete.                 ED Course      Clinical Impression:   The primary encounter diagnosis was Pectoralis muscle strain, initial encounter. A diagnosis of Chest pain was also pertinent to this visit.    Disposition:   Disposition: Discharged  Condition: Stable                        Lindsay Diaz MD  11/23/17 0318

## 2017-11-11 NOTE — DISCHARGE INSTRUCTIONS
Your EKG and blood work did not show any evidence that you have a heart attack. Your symptoms are more consistent with a strain of the pectoral muscle.  Ibuprofen as needed for pain.  Maxilla to take ibuprofen with food and to use Zantac to prevent GI irritation.  Use Maalox as needed for upset stomach and make appointment to follow-up with your primary physician as soon as possible.  Return to ER for any new or worsening

## 2018-07-13 ENCOUNTER — HOSPITAL ENCOUNTER (EMERGENCY)
Facility: HOSPITAL | Age: 33
Discharge: HOME | End: 2018-07-13
Attending: EMERGENCY MEDICINE
Payer: MEDICAID

## 2018-07-13 VITALS
SYSTOLIC BLOOD PRESSURE: 130 MMHG | HEART RATE: 68 BPM | RESPIRATION RATE: 18 BRPM | OXYGEN SATURATION: 99 % | HEIGHT: 59 IN | BODY MASS INDEX: 56.45 KG/M2 | TEMPERATURE: 98 F | WEIGHT: 280 LBS | DIASTOLIC BLOOD PRESSURE: 83 MMHG

## 2018-07-13 DIAGNOSIS — N39.0 URINARY TRACT INFECTION WITHOUT HEMATURIA, SITE UNSPECIFIED: Primary | ICD-10-CM

## 2018-07-13 DIAGNOSIS — R10.13 EPIGASTRIC PAIN: ICD-10-CM

## 2018-07-13 LAB
ALBUMIN SERPL BCP-MCNC: 3.3 G/DL
ALP SERPL-CCNC: 52 U/L
ALT SERPL W/O P-5'-P-CCNC: 11 U/L
ANION GAP SERPL CALC-SCNC: 9 MMOL/L
AST SERPL-CCNC: 15 U/L
B-HCG UR QL: NEGATIVE
BACTERIA #/AREA URNS HPF: ABNORMAL /HPF
BASOPHILS # BLD AUTO: 0.02 K/UL
BASOPHILS NFR BLD: 0.3 %
BILIRUB SERPL-MCNC: 0.4 MG/DL
BILIRUB UR QL STRIP: NEGATIVE
BUN SERPL-MCNC: 12 MG/DL
CALCIUM SERPL-MCNC: 8.9 MG/DL
CHLORIDE SERPL-SCNC: 105 MMOL/L
CLARITY UR: ABNORMAL
CO2 SERPL-SCNC: 26 MMOL/L
COLOR UR: YELLOW
CREAT SERPL-MCNC: 0.8 MG/DL
CTP QC/QA: YES
DIFFERENTIAL METHOD: ABNORMAL
EOSINOPHIL # BLD AUTO: 0 K/UL
EOSINOPHIL NFR BLD: 0.5 %
ERYTHROCYTE [DISTWIDTH] IN BLOOD BY AUTOMATED COUNT: 13.9 %
EST. GFR  (AFRICAN AMERICAN): >60 ML/MIN/1.73 M^2
EST. GFR  (NON AFRICAN AMERICAN): >60 ML/MIN/1.73 M^2
GLUCOSE SERPL-MCNC: 94 MG/DL
GLUCOSE UR QL STRIP: NEGATIVE
HCT VFR BLD AUTO: 35.9 %
HGB BLD-MCNC: 11.7 G/DL
HGB UR QL STRIP: ABNORMAL
KETONES UR QL STRIP: NEGATIVE
LEUKOCYTE ESTERASE UR QL STRIP: ABNORMAL
LIPASE SERPL-CCNC: 15 U/L
LYMPHOCYTES # BLD AUTO: 2.6 K/UL
LYMPHOCYTES NFR BLD: 42.8 %
MCH RBC QN AUTO: 27.5 PG
MCHC RBC AUTO-ENTMCNC: 32.6 G/DL
MCV RBC AUTO: 85 FL
MICROSCOPIC COMMENT: ABNORMAL
MONOCYTES # BLD AUTO: 0.7 K/UL
MONOCYTES NFR BLD: 10.8 %
NEUTROPHILS # BLD AUTO: 2.7 K/UL
NEUTROPHILS NFR BLD: 45.3 %
NITRITE UR QL STRIP: NEGATIVE
PH UR STRIP: 6 [PH] (ref 5–8)
PLATELET # BLD AUTO: 361 K/UL
PMV BLD AUTO: 9.5 FL
POTASSIUM SERPL-SCNC: 4.4 MMOL/L
PROT SERPL-MCNC: 6.7 G/DL
PROT UR QL STRIP: NEGATIVE
RBC # BLD AUTO: 4.25 M/UL
RBC #/AREA URNS HPF: 1 /HPF (ref 0–4)
SODIUM SERPL-SCNC: 140 MMOL/L
SP GR UR STRIP: 1.02 (ref 1–1.03)
SQUAMOUS #/AREA URNS HPF: 10 /HPF
URN SPEC COLLECT METH UR: ABNORMAL
UROBILINOGEN UR STRIP-ACNC: ABNORMAL EU/DL
WBC # BLD AUTO: 6 K/UL
WBC #/AREA URNS HPF: 6 /HPF (ref 0–5)

## 2018-07-13 PROCEDURE — 81000 URINALYSIS NONAUTO W/SCOPE: CPT

## 2018-07-13 PROCEDURE — 87086 URINE CULTURE/COLONY COUNT: CPT

## 2018-07-13 PROCEDURE — 99284 EMERGENCY DEPT VISIT MOD MDM: CPT | Mod: 25

## 2018-07-13 PROCEDURE — 85025 COMPLETE CBC W/AUTO DIFF WBC: CPT

## 2018-07-13 PROCEDURE — 83690 ASSAY OF LIPASE: CPT

## 2018-07-13 PROCEDURE — 25000003 PHARM REV CODE 250: Performed by: EMERGENCY MEDICINE

## 2018-07-13 PROCEDURE — 96374 THER/PROPH/DIAG INJ IV PUSH: CPT

## 2018-07-13 PROCEDURE — 96375 TX/PRO/DX INJ NEW DRUG ADDON: CPT

## 2018-07-13 PROCEDURE — 63600175 PHARM REV CODE 636 W HCPCS: Performed by: EMERGENCY MEDICINE

## 2018-07-13 PROCEDURE — 80053 COMPREHEN METABOLIC PANEL: CPT

## 2018-07-13 PROCEDURE — 81025 URINE PREGNANCY TEST: CPT | Performed by: PHYSICIAN ASSISTANT

## 2018-07-13 RX ORDER — ONDANSETRON 2 MG/ML
4 INJECTION INTRAMUSCULAR; INTRAVENOUS
Status: COMPLETED | OUTPATIENT
Start: 2018-07-13 | End: 2018-07-13

## 2018-07-13 RX ORDER — FLUCONAZOLE 150 MG/1
150 TABLET ORAL DAILY
Qty: 1 TABLET | Refills: 0 | Status: SHIPPED | OUTPATIENT
Start: 2018-07-13 | End: 2018-07-14

## 2018-07-13 RX ORDER — LANSOPRAZOLE 15 MG/1
15 TABLET, ORALLY DISINTEGRATING, DELAYED RELEASE ORAL DAILY
Qty: 30 TABLET | Refills: 0 | Status: SHIPPED | OUTPATIENT
Start: 2018-07-13 | End: 2019-05-09

## 2018-07-13 RX ORDER — CIPROFLOXACIN 500 MG/1
500 TABLET ORAL
Status: COMPLETED | OUTPATIENT
Start: 2018-07-13 | End: 2018-07-13

## 2018-07-13 RX ORDER — KETOROLAC TROMETHAMINE 30 MG/ML
15 INJECTION, SOLUTION INTRAMUSCULAR; INTRAVENOUS
Status: COMPLETED | OUTPATIENT
Start: 2018-07-13 | End: 2018-07-13

## 2018-07-13 RX ORDER — LEVOFLOXACIN 500 MG/1
500 TABLET, FILM COATED ORAL DAILY
Qty: 5 TABLET | Refills: 0 | Status: SHIPPED | OUTPATIENT
Start: 2018-07-13 | End: 2018-07-18

## 2018-07-13 RX ADMIN — CIPROFLOXACIN HYDROCHLORIDE 500 MG: 500 TABLET, FILM COATED ORAL at 01:07

## 2018-07-13 RX ADMIN — KETOROLAC TROMETHAMINE 15 MG: 30 INJECTION, SOLUTION INTRAMUSCULAR at 12:07

## 2018-07-13 RX ADMIN — ONDANSETRON 4 MG: 2 INJECTION INTRAMUSCULAR; INTRAVENOUS at 12:07

## 2018-07-13 RX ADMIN — LIDOCAINE HYDROCHLORIDE: 20 SOLUTION ORAL; TOPICAL at 12:07

## 2018-07-13 NOTE — ED TRIAGE NOTES
Pt states her left sided lower abdominal pain started last night, it radiates around to her back. Reports her abdominal pain really hurts when she eats. States she is nauseated, but denies diarrhea and vomiting.

## 2018-07-13 NOTE — ED PROVIDER NOTES
"Encounter Date: 2018    SCRIBE #1 NOTE: I, George Grider, am scribing for, and in the presence of,  Fay Ferguson MD. I have scribed the following portions of the note - Other sections scribed: HPI, ROS, PE.     33 y/o female presenting for 1 day history of L flank pain and epigastric with associated nausea since last night. Denies fever, vomiting, diarrhea, urinary symptoms, CP, SOB. Initial orders placed. Maral Knowles PA-C   History     Chief Complaint   Patient presents with    Flank Pain     left flank pain started last night; denies chest pain     CC: Flank Pain    HPI: 33 y/o female with hx of cervical dysplasia, gonorrhea, and morbid obesity presents to the ED c/o acute, moderate (8/10) L sided flank pain that began yesterday. Pt reports that the pain worsened this morning and began also experiencing epigastric pain which she describes as "a burning" whenever she eats. Pt notes normal BM. Pt states she took tylenol this morning PTA with no relief of symptoms. Pt is also c/o nausea. Pt denies personal hx of smoking, alcohol or drug use. Pt denies dysuria, hematuria, vaginal discharge, fever, vomiting, diarrhea, or any other associated symptoms.       The history is provided by the patient. No  was used.     Review of patient's allergies indicates:  No Known Allergies  Past Medical History:   Diagnosis Date    Cervical dysplasia     Cyst, ovarian     pt reported    Gonorrhea     Morbid obesity      Past Surgical History:   Procedure Laterality Date    CERVICAL BIOPSY      CERVIX SURGERY       SECTION, CLASSIC      CHOLECYSTECTOMY           DILATION AND CURETTAGE OF UTERUS      GASTRECTOMY  2013    Sleeve    gastric sleeve  11.25.13    icd filter       Family History   Problem Relation Age of Onset    Diabetes Maternal Grandmother          of MI at 63     Heart disease Maternal Grandmother     Hypertension Maternal Grandmother     Stroke " Maternal Grandmother     Obesity Maternal Grandmother     Obesity Mother          at 26 years of Gun shot wound    Diabetes Mother     Diabetes Father      Social History   Substance Use Topics    Smoking status: Never Smoker    Smokeless tobacco: Never Used    Alcohol use No     Review of Systems   Constitutional: Negative for chills and fever.   HENT: Negative for congestion, ear pain, rhinorrhea and sore throat.    Eyes: Negative for pain.   Respiratory: Negative for shortness of breath.    Cardiovascular: Negative for chest pain.   Gastrointestinal: Positive for abdominal pain (epigastric abdominal pain) and nausea. Negative for diarrhea and vomiting.   Genitourinary: Positive for flank pain (L sided flank pain). Negative for dysuria, frequency, hematuria and vaginal discharge.   Musculoskeletal: Negative for back pain and neck pain.   Skin: Negative for rash.   Neurological: Negative for weakness and headaches.   All other systems reviewed and are negative.      Physical Exam     Initial Vitals [18 1131]   BP Pulse Resp Temp SpO2   (!) 165/73 91 20 98.5 °F (36.9 °C) 96 %      MAP       --         Physical Exam    Nursing note and vitals reviewed.  Constitutional: She appears well-developed and well-nourished. She is not diaphoretic. No distress.   HENT:   Head: Normocephalic and atraumatic.   Nose: Nose normal.   Eyes: EOM are normal. Pupils are equal, round, and reactive to light. No scleral icterus.   Neck: Normal range of motion. Neck supple.   Cardiovascular: Normal rate, regular rhythm, normal heart sounds and intact distal pulses. Exam reveals no gallop and no friction rub.    No murmur heard.  Pulmonary/Chest: Breath sounds normal. No stridor. No respiratory distress. She has no wheezes. She has no rhonchi. She has no rales.   Abdominal: Soft. Normal appearance and bowel sounds are normal. She exhibits no distension. There is tenderness (Pt has mild epigastric tenderness. No  tenderness in RUQ or RLQ.). There is no rebound and no guarding.   Musculoskeletal: Normal range of motion. She exhibits no edema or tenderness.   Neurological: She is alert and oriented to person, place, and time. No cranial nerve deficit.   Skin: Skin is warm and dry. No rash noted.   Psychiatric: She has a normal mood and affect. Her behavior is normal.         ED Course   Procedures  Labs Reviewed   URINALYSIS - Abnormal; Notable for the following:        Result Value    Appearance, UA Hazy (*)     Occult Blood UA 1+ (*)     Urobilinogen, UA 2.0-3.0 (*)     Leukocytes, UA 2+ (*)     All other components within normal limits   CBC W/ AUTO DIFFERENTIAL - Abnormal; Notable for the following:     Hemoglobin 11.7 (*)     Hematocrit 35.9 (*)     Platelets 361 (*)     All other components within normal limits   COMPREHENSIVE METABOLIC PANEL - Abnormal; Notable for the following:     Albumin 3.3 (*)     Alkaline Phosphatase 52 (*)     All other components within normal limits   URINALYSIS MICROSCOPIC - Abnormal; Notable for the following:     WBC, UA 6 (*)     Bacteria, UA Many (*)     All other components within normal limits   CULTURE, URINE   LIPASE   POCT URINE PREGNANCY          Imaging Results    None          Medical Decision Making:   Initial Assessment:   Presents with epigastric pain and flank pain. Possible UTI or kidney stone.  Also possibly early gastritis.  Doubt obstruction or complication of her gastric sleeve.  Differential Diagnosis:   Gastritis  Biliary Colic  Less likely complication from her gastric sleeve  Pyelonephritis  UTI  Kidney stone  Clinical Tests:   Lab Tests: Ordered and Reviewed  Patient made aware of urine results.  Advised to take a probiotic since the antibiotics may exacerbate her epigastric symptoms.  Also advised to follow up with her surgeon or return to the emergency department if she begins to have vomiting or increased pain.            Scribe Attestation:   Scribe #1: I  performed the above scribed service and the documentation accurately describes the services I performed. I attest to the accuracy of the note.    Attending Attestation:           Physician Attestation for Scribe:  Physician Attestation Statement for Scribe #1: I, Fay Ferguson MD, reviewed documentation, as scribed by George Grider in my presence, and it is both accurate and complete.                    Clinical Impression:   The primary encounter diagnosis was Urinary tract infection without hematuria, site unspecified. A diagnosis of Epigastric pain was also pertinent to this visit.      Disposition:   Disposition: Discharged  Condition: Stable                        Fay Ferguson MD  07/13/18 [id number]

## 2018-07-15 LAB — BACTERIA UR CULT: NORMAL

## 2018-10-03 ENCOUNTER — TELEPHONE (OUTPATIENT)
Dept: BARIATRICS | Facility: CLINIC | Age: 33
End: 2018-10-03

## 2018-10-03 NOTE — TELEPHONE ENCOUNTER
S/w pt she will be getting private insurance and was interested in a resleeve. Told pt we do not do them, we can do a revision sleeve to a bypass or a balloon ( which is out of pocket), pt will research

## 2018-10-03 NOTE — TELEPHONE ENCOUNTER
"----- Message from Farideh Petty sent at 10/3/2018  4:36 PM CDT -----  Contact: Patient  Patient would like to speak to someone about a gastric sleeve revision, Dr. Abad did the initial procedure and saw her after. Patient Has medicaid, but not sure if she is considered a "new" patient or not. Please contact the patient regarding this matter [phone]  "

## 2019-04-17 ENCOUNTER — HOSPITAL ENCOUNTER (EMERGENCY)
Facility: HOSPITAL | Age: 34
Discharge: HOME | End: 2019-04-17
Attending: EMERGENCY MEDICINE
Payer: MEDICAID

## 2019-04-17 VITALS
BODY MASS INDEX: 57.45 KG/M2 | WEIGHT: 285 LBS | HEIGHT: 59 IN | OXYGEN SATURATION: 97 % | SYSTOLIC BLOOD PRESSURE: 126 MMHG | HEART RATE: 81 BPM | TEMPERATURE: 99 F | RESPIRATION RATE: 18 BRPM | DIASTOLIC BLOOD PRESSURE: 60 MMHG

## 2019-04-17 DIAGNOSIS — R10.84 GENERALIZED ABDOMINAL PAIN: Primary | ICD-10-CM

## 2019-04-17 LAB
ALBUMIN SERPL BCP-MCNC: 3.5 G/DL (ref 3.5–5.2)
ALP SERPL-CCNC: 46 U/L (ref 55–135)
ALT SERPL W/O P-5'-P-CCNC: 11 U/L (ref 10–44)
ANION GAP SERPL CALC-SCNC: 8 MMOL/L (ref 8–16)
AST SERPL-CCNC: 17 U/L (ref 10–40)
B-HCG UR QL: NEGATIVE
BASOPHILS # BLD AUTO: 0.01 K/UL (ref 0–0.2)
BASOPHILS NFR BLD: 0.2 % (ref 0–1.9)
BILIRUB SERPL-MCNC: 0.7 MG/DL (ref 0.1–1)
BILIRUB UR QL STRIP: NEGATIVE
BUN SERPL-MCNC: 10 MG/DL (ref 6–20)
CALCIUM SERPL-MCNC: 8.8 MG/DL (ref 8.7–10.5)
CHLORIDE SERPL-SCNC: 106 MMOL/L (ref 95–110)
CLARITY UR: CLEAR
CO2 SERPL-SCNC: 24 MMOL/L (ref 23–29)
COLOR UR: YELLOW
CREAT SERPL-MCNC: 0.8 MG/DL (ref 0.5–1.4)
CTP QC/QA: YES
DIFFERENTIAL METHOD: ABNORMAL
EOSINOPHIL # BLD AUTO: 0 K/UL (ref 0–0.5)
EOSINOPHIL NFR BLD: 0.5 % (ref 0–8)
ERYTHROCYTE [DISTWIDTH] IN BLOOD BY AUTOMATED COUNT: 14.1 % (ref 11.5–14.5)
EST. GFR  (AFRICAN AMERICAN): >60 ML/MIN/1.73 M^2
EST. GFR  (NON AFRICAN AMERICAN): >60 ML/MIN/1.73 M^2
GLUCOSE SERPL-MCNC: 118 MG/DL (ref 70–110)
GLUCOSE UR QL STRIP: NEGATIVE
HCT VFR BLD AUTO: 39.3 % (ref 37–48.5)
HGB BLD-MCNC: 12.6 G/DL (ref 12–16)
HGB UR QL STRIP: NEGATIVE
KETONES UR QL STRIP: NEGATIVE
LEUKOCYTE ESTERASE UR QL STRIP: NEGATIVE
LIPASE SERPL-CCNC: 13 U/L (ref 4–60)
LYMPHOCYTES # BLD AUTO: 1.8 K/UL (ref 1–4.8)
LYMPHOCYTES NFR BLD: 28.3 % (ref 18–48)
MCH RBC QN AUTO: 27.5 PG (ref 27–31)
MCHC RBC AUTO-ENTMCNC: 32.1 G/DL (ref 32–36)
MCV RBC AUTO: 86 FL (ref 82–98)
MONOCYTES # BLD AUTO: 0.6 K/UL (ref 0.3–1)
MONOCYTES NFR BLD: 8.7 % (ref 4–15)
NEUTROPHILS # BLD AUTO: 4 K/UL (ref 1.8–7.7)
NEUTROPHILS NFR BLD: 62.3 % (ref 38–73)
NITRITE UR QL STRIP: NEGATIVE
PH UR STRIP: 5 [PH] (ref 5–8)
PLATELET # BLD AUTO: 416 K/UL (ref 150–350)
PMV BLD AUTO: 9.6 FL (ref 9.2–12.9)
POTASSIUM SERPL-SCNC: 3.7 MMOL/L (ref 3.5–5.1)
PROT SERPL-MCNC: 7.3 G/DL (ref 6–8.4)
PROT UR QL STRIP: NEGATIVE
RBC # BLD AUTO: 4.58 M/UL (ref 4–5.4)
SODIUM SERPL-SCNC: 138 MMOL/L (ref 136–145)
SP GR UR STRIP: 1.03 (ref 1–1.03)
URN SPEC COLLECT METH UR: NORMAL
UROBILINOGEN UR STRIP-ACNC: NEGATIVE EU/DL
WBC # BLD AUTO: 6.46 K/UL (ref 3.9–12.7)

## 2019-04-17 PROCEDURE — 80053 COMPREHEN METABOLIC PANEL: CPT

## 2019-04-17 PROCEDURE — 99284 EMERGENCY DEPT VISIT MOD MDM: CPT | Mod: 25

## 2019-04-17 PROCEDURE — 96374 THER/PROPH/DIAG INJ IV PUSH: CPT

## 2019-04-17 PROCEDURE — 96372 THER/PROPH/DIAG INJ SC/IM: CPT

## 2019-04-17 PROCEDURE — 81003 URINALYSIS AUTO W/O SCOPE: CPT

## 2019-04-17 PROCEDURE — 25000003 PHARM REV CODE 250: Performed by: NURSE PRACTITIONER

## 2019-04-17 PROCEDURE — 85025 COMPLETE CBC W/AUTO DIFF WBC: CPT

## 2019-04-17 PROCEDURE — 96361 HYDRATE IV INFUSION ADD-ON: CPT

## 2019-04-17 PROCEDURE — 83690 ASSAY OF LIPASE: CPT

## 2019-04-17 PROCEDURE — 81025 URINE PREGNANCY TEST: CPT | Performed by: NURSE PRACTITIONER

## 2019-04-17 PROCEDURE — 63600175 PHARM REV CODE 636 W HCPCS: Performed by: NURSE PRACTITIONER

## 2019-04-17 RX ORDER — ONDANSETRON 2 MG/ML
4 INJECTION INTRAMUSCULAR; INTRAVENOUS
Status: COMPLETED | OUTPATIENT
Start: 2019-04-17 | End: 2019-04-17

## 2019-04-17 RX ORDER — DOCUSATE SODIUM 100 MG/1
100 CAPSULE, LIQUID FILLED ORAL 2 TIMES DAILY PRN
Qty: 60 CAPSULE | Refills: 0 | Status: SHIPPED | OUTPATIENT
Start: 2019-04-17 | End: 2019-05-09

## 2019-04-17 RX ORDER — DICYCLOMINE HYDROCHLORIDE 10 MG/ML
20 INJECTION INTRAMUSCULAR
Status: COMPLETED | OUTPATIENT
Start: 2019-04-17 | End: 2019-04-17

## 2019-04-17 RX ADMIN — DICYCLOMINE HYDROCHLORIDE 20 MG: 20 INJECTION, SOLUTION INTRAMUSCULAR at 11:04

## 2019-04-17 RX ADMIN — ONDANSETRON 4 MG: 2 INJECTION INTRAMUSCULAR; INTRAVENOUS at 10:04

## 2019-04-17 RX ADMIN — SODIUM CHLORIDE 1000 ML: 0.9 INJECTION, SOLUTION INTRAVENOUS at 10:04

## 2019-04-17 NOTE — ED TRIAGE NOTES
Pt here for lower abd pain with nausea that began this morning. Took ibuprofen around 0730 without relief. Pain has gotten progressively worse. Describes as stabbing pain with spasms.

## 2019-04-17 NOTE — ED PROVIDER NOTES
Encounter Date: 2019    SCRIBE #1 NOTE: I, Lexis Karis, am scribing for, and in the presence of,  America Rivera. I have scribed the entire note.       History     Chief Complaint   Patient presents with    Abdominal Pain     pt c/o abdominal pain with nv starting at 7:00 am     Time seen by provider: 9:27 AM  This is a 33 y.o. female with a PMHx of cervical dysplasia, gonorrhea, and PSHx of  (11 years ago) who presents to the Emergency Department with a cc of abdominal pain. The pain began 2 hours ago, is located to the lower abdomen, and described as severe. Associated symptoms include nausea and 1 episode of vomiting. She denies fever, diarrhea, vaginal discharge, or dysuria. She reports no worsening or alleviating factors. She reports a prior history of similar symptoms. Last normal BM was last night.        The history is provided by the patient.     Review of patient's allergies indicates:  No Known Allergies  Past Medical History:   Diagnosis Date    Cervical dysplasia     Cyst, ovarian     pt reported    Gonorrhea     Morbid obesity      Past Surgical History:   Procedure Laterality Date    CERVICAL BIOPSY      CERVIX SURGERY       SECTION, CLASSIC      CHOLECYSTECTOMY           DILATION AND CURETTAGE OF UTERUS      EGD (ESOPHAGOGASTRODUODENOSCOPY) N/A 2013    Performed by [facility]ro [facility] Jr[facility] at [facility] OR 2ND FLR    EGD (ESOPHAGOGASTRODUODENOSCOPY) N/A 2013    Performed by Froylan Graves MD at [facility] ENDO (2ND FLR)    GASTRECTOMY  2013    Sleeve    GASTRECTOMY, SLEEVE, LAPAROSCOPIC N/A 2013    Performed by [facility] Jr[facility] at [facility] OR 2ND [facility]LR    gastric sleeve  .    icd filter      INSERTION-FILTER-INFERIOR VENA CAVA N/A 2013    Performed by Dejuan Malik MD at [facility] OR 2ND FLR     Family History   Problem Relation Age of Onset    Diabetes Maternal Grandmother          of MI at 63     Heart disease  Maternal Grandmother     Hypertension Maternal Grandmother     Stroke Maternal Grandmother     Obesity Maternal Grandmother     Obesity Mother          at 26 years of Gun shot wound    Diabetes Mother     Diabetes Father      Social History     Tobacco Use    Smoking status: Never Smoker    Smokeless tobacco: Never Used   Substance Use Topics    Alcohol use: No    Drug use: No     Review of Systems   Constitutional: Negative for chills and fever.   HENT: Negative for sore throat and trouble swallowing.    Respiratory: Negative for shortness of breath.    Cardiovascular: Negative for chest pain.   Gastrointestinal: Positive for abdominal pain, nausea and vomiting. Negative for diarrhea.   Genitourinary: Negative for dysuria and vaginal discharge.   Musculoskeletal: Negative for neck pain.   Skin: Negative for rash.   Neurological: Negative for seizures and weakness.   Psychiatric/Behavioral: Negative for confusion.   All other systems reviewed and are negative.      Physical Exam     Initial Vitals   BP Pulse Resp Temp SpO2   19 0908 19 0908 19 0908 19 0908 19 1128   118/72 93 20 99.2 °F (37.3 °C) 97 %      MAP       --                Physical Exam    Nursing note and vitals reviewed.  Constitutional: She appears well-developed.   obese   HENT:   Mouth/Throat: Oropharynx is clear and moist and mucous membranes are normal.   Cardiovascular: Normal rate and S2 normal.   Pulmonary/Chest: Breath sounds normal. She has no wheezes. She has no rhonchi. She has no rales.   Abdominal: Soft. She exhibits no distension. There is tenderness in the suprapubic area. There is tenderness at McBurney's point. There is no rebound and no guarding.   Tenderness over the lower abdomen   Skin: Capillary refill takes less than 2 seconds.         ED Course   Procedures  Labs Reviewed   CBC W/ AUTO DIFFERENTIAL - Abnormal; Notable for the following components:       Result Value    Platelets  416 (*)     All other components within normal limits   COMPREHENSIVE METABOLIC PANEL - Abnormal; Notable for the following components:    Glucose 118 (*)     Alkaline Phosphatase 46 (*)     All other components within normal limits   LIPASE   URINALYSIS, REFLEX TO URINE CULTURE    Narrative:     Preferred Collection Type->Urine, Clean Catch   POCT URINE PREGNANCY          Imaging Results          CT Abdomen Pelvis  Without Contrast (Final result)  Result time 04/17/19 11:13:00    Final result by Shravan Holloway MD (04/17/19 11:13:00)                 Impression:      No acute abnormality to explain patient's abdominal pain.    Postoperative changes of sleeve gastrectomy and cholecystectomy.    Infrarenal IVC filter.  If filter is no longer needed, consider outpatient consultation with [facility] interventional radiology for IVC filter retrieval.      Electronically signed by: Shravan Holloway MD  Date:    04/17/2019  Time:    11:13             Narrative:    EXAMINATION:  CT ABDOMEN PELVIS WITHOUT CONTRAST    CLINICAL HISTORY:  Abdominal pain, unspecified;    TECHNIQUE:  Low dose axial images, sagittal and coronal reformations were obtained from the lung bases to the pubic symphysis.  No oral contrast was administered.    COMPARISON:  None    FINDINGS:  Visualized portions of the lung bases and heart demonstrate no significant abnormalities.    Liver demonstrates no focal abnormalities on noncontrast imaging.  Gallbladder is surgically absent.    Postop changes of sleeve gastrectomy.  Stomach is otherwise unremarkable.  Spleen, pancreas and adrenal glands demonstrate no significant abnormality.    Kidneys show no evidence of nephrolithiasis or hydronephrosis.  No stones within the expected courses of the ureters or urinary bladder.  Urinary bladder is nondistended.  Uterus and adnexal structures demonstrate no acute abnormality.  Surgical clip in the right hemipelvis.    Small and large bowel show no evidence of  obstruction.  No free air or ascites.  The hepatic flexure has a retro hepatic course.  Appendix demonstrates no significant abnormality.    Aorta is normal in caliber.  No abnormal lymph node enlargement.  There is a infrarenal IVC filter.  Bones demonstrate no acute abnormalities.  Small fat containing umbilical hernia.                                 Medical Decision Making:   Initial Assessment:   33-year-old female presents with abdominal pain  Differential Diagnosis:   Gastroenteritis, constipation, appendicitis  ED Management:  Diagnosis management comments: This is an urgent evaluation of a 33-year-old female that presented to the ER with c/o abdominal pain . Pts exam was as above.     Labs and  CT were reviewed and discussed with pt.  There are no acute findings    Based on exam today - I have low suspicion for medical, surgical or other life threatening condition and I believe pt is safe for discharge and outpatient f/u.  I have discussed red flags patient to include worsening abdominal pain, fever, vomiting or any other concerns.  I have given her injection of Bentyl emergency department and Zofran.  She states that her nausea is improved.  Upon my review of the CT there does appear to be a fair stool load which I have discussed with patient.    Pt, and , verbalizes understanding of d/c instructions and will return for worsening condition.    Patient request work note                     ED Course as of Apr 17 1456   Wed Apr 17, 2019   1053 Albumin: 3.5 [CS]      ED Course User Index  [CS] America Hazel NP     Clinical Impression:     1. Generalized abdominal pain            Disposition:   Disposition: Discharged  Condition: Stable    Scribe attestation: I, GRETCHEN Hazel, personally performed the services described in this documentation.  All medical record entries made by the scribe were at my direction and in my presence.  I have reviewed the chart and agree that the record reflects my personal  performance and is accurate and complete.                      America Hazel, NP  04/17/19 8975

## 2019-04-24 ENCOUNTER — TELEPHONE (OUTPATIENT)
Dept: INTERVENTIONAL RADIOLOGY/VASCULAR | Facility: CLINIC | Age: 34
End: 2019-04-24

## 2019-04-24 NOTE — TELEPHONE ENCOUNTER
Contacted patient to schedule consult to discuss IVC filter removal. No answer/left voice message to contact [phone].

## 2019-05-09 ENCOUNTER — LAB VISIT (OUTPATIENT)
Dept: LAB | Facility: HOSPITAL | Age: 34
End: 2019-05-09
Payer: MEDICAID

## 2019-05-09 ENCOUNTER — OFFICE VISIT (OUTPATIENT)
Dept: INTERVENTIONAL RADIOLOGY/VASCULAR | Facility: CLINIC | Age: 34
End: 2019-05-09
Payer: MEDICAID

## 2019-05-09 VITALS
BODY MASS INDEX: 59.07 KG/M2 | HEIGHT: 59 IN | SYSTOLIC BLOOD PRESSURE: 147 MMHG | DIASTOLIC BLOOD PRESSURE: 93 MMHG | HEART RATE: 97 BPM | WEIGHT: 293 LBS

## 2019-05-09 DIAGNOSIS — Z95.828 PRESENCE OF IVC FILTER: Primary | ICD-10-CM

## 2019-05-09 DIAGNOSIS — Z95.828 PRESENCE OF IVC FILTER: ICD-10-CM

## 2019-05-09 LAB
ALBUMIN SERPL BCP-MCNC: 3.3 G/DL (ref 3.5–5.2)
ALP SERPL-CCNC: 53 U/L (ref 55–135)
ALT SERPL W/O P-5'-P-CCNC: 11 U/L (ref 10–44)
ANION GAP SERPL CALC-SCNC: 8 MMOL/L (ref 8–16)
AST SERPL-CCNC: 16 U/L (ref 10–40)
BASOPHILS # BLD AUTO: 0.04 K/UL (ref 0–0.2)
BASOPHILS NFR BLD: 0.5 % (ref 0–1.9)
BILIRUB SERPL-MCNC: 0.6 MG/DL (ref 0.1–1)
BUN SERPL-MCNC: 11 MG/DL (ref 6–20)
CALCIUM SERPL-MCNC: 9.2 MG/DL (ref 8.7–10.5)
CHLORIDE SERPL-SCNC: 106 MMOL/L (ref 95–110)
CO2 SERPL-SCNC: 27 MMOL/L (ref 23–29)
CREAT SERPL-MCNC: 0.8 MG/DL (ref 0.5–1.4)
DIFFERENTIAL METHOD: ABNORMAL
EOSINOPHIL # BLD AUTO: 0 K/UL (ref 0–0.5)
EOSINOPHIL NFR BLD: 0.5 % (ref 0–8)
ERYTHROCYTE [DISTWIDTH] IN BLOOD BY AUTOMATED COUNT: 14.1 % (ref 11.5–14.5)
EST. GFR  (AFRICAN AMERICAN): >60 ML/MIN/1.73 M^2
EST. GFR  (NON AFRICAN AMERICAN): >60 ML/MIN/1.73 M^2
GLUCOSE SERPL-MCNC: 77 MG/DL (ref 70–110)
HCT VFR BLD AUTO: 40.9 % (ref 37–48.5)
HGB BLD-MCNC: 12.8 G/DL (ref 12–16)
IMM GRANULOCYTES # BLD AUTO: 0.02 K/UL (ref 0–0.04)
IMM GRANULOCYTES NFR BLD AUTO: 0.2 % (ref 0–0.5)
INR PPP: 0.9 (ref 0.8–1.2)
LYMPHOCYTES # BLD AUTO: 3.1 K/UL (ref 1–4.8)
LYMPHOCYTES NFR BLD: 36.6 % (ref 18–48)
MCH RBC QN AUTO: 26.9 PG (ref 27–31)
MCHC RBC AUTO-ENTMCNC: 31.3 G/DL (ref 32–36)
MCV RBC AUTO: 86 FL (ref 82–98)
MONOCYTES # BLD AUTO: 0.9 K/UL (ref 0.3–1)
MONOCYTES NFR BLD: 10.5 % (ref 4–15)
NEUTROPHILS # BLD AUTO: 4.3 K/UL (ref 1.8–7.7)
NEUTROPHILS NFR BLD: 51.7 % (ref 38–73)
NRBC BLD-RTO: 0 /100 WBC
PLATELET # BLD AUTO: 444 K/UL (ref 150–350)
PMV BLD AUTO: 9.4 FL (ref 9.2–12.9)
POTASSIUM SERPL-SCNC: 4.1 MMOL/L (ref 3.5–5.1)
PROT SERPL-MCNC: 7.2 G/DL (ref 6–8.4)
PROTHROMBIN TIME: 9.8 SEC (ref 9–12.5)
RBC # BLD AUTO: 4.75 M/UL (ref 4–5.4)
SODIUM SERPL-SCNC: 141 MMOL/L (ref 136–145)
WBC # BLD AUTO: 8.37 K/UL (ref 3.9–12.7)

## 2019-05-09 PROCEDURE — 99204 PR OFFICE/OUTPT VISIT, NEW, LEVL IV, 45-59 MIN: ICD-10-PCS | Mod: S$PBB,,, | Performed by: FAMILY MEDICINE

## 2019-05-09 PROCEDURE — 99999 PR PBB SHADOW E&M-EST. PATIENT-LVL III: ICD-10-PCS | Mod: PBBFAC,,, | Performed by: FAMILY MEDICINE

## 2019-05-09 PROCEDURE — 99213 OFFICE O/P EST LOW 20 MIN: CPT | Mod: PBBFAC | Performed by: FAMILY MEDICINE

## 2019-05-09 PROCEDURE — 99204 OFFICE O/P NEW MOD 45 MIN: CPT | Mod: S$PBB,,, | Performed by: FAMILY MEDICINE

## 2019-05-09 PROCEDURE — 80053 COMPREHEN METABOLIC PANEL: CPT

## 2019-05-09 PROCEDURE — 85610 PROTHROMBIN TIME: CPT

## 2019-05-09 PROCEDURE — 85025 COMPLETE CBC W/AUTO DIFF WBC: CPT

## 2019-05-09 PROCEDURE — 36415 COLL VENOUS BLD VENIPUNCTURE: CPT

## 2019-05-09 PROCEDURE — 99999 PR PBB SHADOW E&M-EST. PATIENT-LVL III: CPT | Mod: PBBFAC,,, | Performed by: FAMILY MEDICINE

## 2019-05-09 NOTE — LETTER
May 9, 2019      [facility]  [address]  [facility] [address]  Phone: [phone]       Patient: Trisha Ferguson   YOB: 1985  Date of Visit: 05/09/2019    To Whom It May Concern:    Peggy Ferguson  was at [facility] on 05/09/2019. She may return to work/school on 5/9/2019 with no restrictions. If you have any questions or concerns, or if I can be of further assistance, please do not hesitate to contact me.    Sincerely,        Melva Pop, NP

## 2019-05-09 NOTE — PROGRESS NOTES
Subjective:       Patient ID: Trisha Ferguson is a 33 y.o. female.    Chief Complaint: Presence of IVC filter    Patient here with complaints of an IVC filter in place. She reports the filter was placed in 2013 as prophylaxis prior to bariatric surgery. She underwent a gastric sleeve. She denies ever had any clotting issues. She tells me her insurance changed after her surgery, so she never had it removed. She denies any complaints associated with the filter such as leg swelling or abdominal pain. Of note, she tells me she has gained her weight back because of stress.    Review of Systems   Constitutional: Positive for activity change (decreased), appetite change (increased) and fatigue (due to weight gain). Negative for chills and fever.   HENT: Negative for congestion, drooling, ear discharge, postnasal drip, sneezing and trouble swallowing.    Eyes: Negative for pain, discharge, redness and itching.        Wears glasses   Respiratory: Negative for cough, shortness of breath, wheezing and stridor.    Cardiovascular: Negative for chest pain, palpitations and leg swelling.   Gastrointestinal: Negative for abdominal distention, abdominal pain, constipation, diarrhea, nausea and vomiting.   Genitourinary: Negative for difficulty urinating, dysuria, frequency and urgency.   Musculoskeletal: Negative for arthralgias, back pain, gait problem, joint swelling, myalgias and neck pain.   Skin: Negative for color change, pallor and rash.   Neurological: Negative for dizziness, weakness and headaches.       Objective:      Physical Exam   Constitutional: She is oriented to person, place, and time. She appears well-developed and well-nourished. No distress.   HENT:   Head: Normocephalic and atraumatic.   Right Ear: External ear normal.   Left Ear: External ear normal.   Nose: Nose normal.   Mouth/Throat: Oropharynx is clear and moist. No oropharyngeal exudate.   Eyes: Pupils are equal, round, and reactive to light. Conjunctivae  are normal. Right eye exhibits no discharge. Left eye exhibits no discharge. No scleral icterus.   Neck: Neck supple. No JVD present. No tracheal deviation present. No thyromegaly present.   Cardiovascular: Normal rate, regular rhythm, normal heart sounds and intact distal pulses. Exam reveals no gallop and no friction rub.   No murmur heard.  Pulmonary/Chest: Effort normal and breath sounds normal. No stridor. No respiratory distress. She has no wheezes. She has no rales.   Abdominal: Soft. Bowel sounds are normal. She exhibits no distension and no mass. There is no hepatosplenomegaly. There is no tenderness. There is no rebound and no guarding.   Musculoskeletal: She exhibits edema (trace bilateral ankle edema).   Lymphadenopathy:     She has no cervical adenopathy.   Neurological: She is alert and oriented to person, place, and time. Gait normal.   Skin: Skin is warm and dry. She is not diaphoretic. No cyanosis. Nails show no clubbing.   Psychiatric: She has a normal mood and affect.   Vitals reviewed.       Imaging:  CT scan 4/17/2019  Impression       No acute abnormality to explain patient's abdominal pain.    Postoperative changes of sleeve gastrectomy and cholecystectomy.    Infrarenal IVC filter.  If filter is no longer needed, consider outpatient consultation with [facility] intervention[facility] radiology for IVC filter retrieval.       Assessment:       1. Presence of IVC filter        Plan:         Reviewed images with patient. Pointed out IVC filter. Explained recommendation is to remove filter. Explained potential risks of leaving filter in place include breakage, clot formation, and perforation of IVC. Discussed how IVC filter retrieval would be performed, risks (including, but not limited to, pain, bleeding, infection, damage to nearby structures, and the need for additional procedures such as stent placement), benefits, possible complications, pre-post procedure expectations, and alternatives. The patient  voices understanding and all questions have been answered.  The patient agrees to proceed as planned. Dr. Holloway in room. Written informed consent obtained. Patient scheduled for 5/21/2019. Pre-procedure handout with clinic phone number provided. Patient would like to have pre-procedure labs drawn today.

## 2019-05-09 NOTE — LETTER
May 9, 2019    Trisha Marty  [address] [address]     [facility] Interventional [facility]  [address]  [facility] [address]  Phone: [phone] PRE-PROCEDURE INSTRUCTIONS    Your procedure with Interventional Radiology is scheduled for May 21, 2019. Please arrive by 12:00pm.    You must check-in and receive a wristband before going to your procedure. Your [facility]-in [facility].    **Do not eat or drink anything between midnight and the time of your procedure. This includes gum, mints, and dandy lemon drops.    **Do not smoke or drink alcoholic beverages 24 hours prior to your procedure.    **If you wear contact lenses, dentures, hearing aids, or glasses, bring a container to put them in during the procedure and give them to a family member for safekeeping.    **If you have been diagnosed with sleep apnea please bring your CPAP machine.    **If your doctor has scheduled you for an overnight stay, bring a small overnight bag with any personal items that you may need.    **Make arrangements in advance for transportation home by a responsible adult. It is not safe to drive a vehicle during the 24 hours following the procedure.    **All [facility] facilities and properties are tobacco free. Smoking is NOT allowed.    PLEASE NOTE: The procedure schedule has many variables which affect the time of your procedure. Family members should be available if your surgery time changes.    If you have any questions about these instructions call Interventional Radiology at [phone] Monday - Friday between 8:00am and 4:00pm or [phone] for after hours.

## 2019-05-20 ENCOUNTER — TELEPHONE (OUTPATIENT)
Dept: INTERVENTIONAL RADIOLOGY/VASCULAR | Facility: HOSPITAL | Age: 34
End: 2019-05-20

## 2019-05-21 ENCOUNTER — HOSPITAL ENCOUNTER (OUTPATIENT)
Facility: HOSPITAL | Age: 34
Discharge: HOME | End: 2019-05-21
Attending: RADIOLOGY | Admitting: RADIOLOGY
Payer: MEDICAID

## 2019-05-21 VITALS
HEIGHT: 59 IN | BODY MASS INDEX: 59.07 KG/M2 | WEIGHT: 293 LBS | HEART RATE: 78 BPM | DIASTOLIC BLOOD PRESSURE: 78 MMHG | RESPIRATION RATE: 16 BRPM | SYSTOLIC BLOOD PRESSURE: 119 MMHG | TEMPERATURE: 99 F | OXYGEN SATURATION: 100 %

## 2019-05-21 DIAGNOSIS — Z95.828 PRESENCE OF IVC FILTER: ICD-10-CM

## 2019-05-21 LAB
B-HCG UR QL: NEGATIVE
CTP QC/QA: YES

## 2019-05-21 PROCEDURE — 63600175 PHARM REV CODE 636 W HCPCS: Performed by: FAMILY MEDICINE

## 2019-05-21 PROCEDURE — 63600175 PHARM REV CODE 636 W HCPCS: Performed by: RADIOLOGY

## 2019-05-21 PROCEDURE — 25500020 PHARM REV CODE 255: Performed by: RADIOLOGY

## 2019-05-21 PROCEDURE — 25000003 PHARM REV CODE 250: Performed by: FAMILY MEDICINE

## 2019-05-21 PROCEDURE — 81025 URINE PREGNANCY TEST: CPT | Performed by: FAMILY MEDICINE

## 2019-05-21 PROCEDURE — 25000003 PHARM REV CODE 250: Performed by: RADIOLOGY

## 2019-05-21 RX ORDER — SODIUM CHLORIDE 9 MG/ML
500 INJECTION, SOLUTION INTRAVENOUS ONCE
Status: COMPLETED | OUTPATIENT
Start: 2019-05-21 | End: 2019-05-21

## 2019-05-21 RX ORDER — FENTANYL CITRATE 50 UG/ML
50 INJECTION, SOLUTION INTRAMUSCULAR; INTRAVENOUS
Status: DISCONTINUED | OUTPATIENT
Start: 2019-05-21 | End: 2019-05-21 | Stop reason: HOSPADM

## 2019-05-21 RX ORDER — MIDAZOLAM HYDROCHLORIDE 1 MG/ML
1 INJECTION INTRAMUSCULAR; INTRAVENOUS
Status: DISCONTINUED | OUTPATIENT
Start: 2019-05-21 | End: 2019-05-21 | Stop reason: HOSPADM

## 2019-05-21 RX ORDER — MIDAZOLAM HYDROCHLORIDE 1 MG/ML
INJECTION INTRAMUSCULAR; INTRAVENOUS CODE/TRAUMA/SEDATION MEDICATION
Status: COMPLETED | OUTPATIENT
Start: 2019-05-21 | End: 2019-05-21

## 2019-05-21 RX ORDER — FENTANYL CITRATE 50 UG/ML
INJECTION, SOLUTION INTRAMUSCULAR; INTRAVENOUS CODE/TRAUMA/SEDATION MEDICATION
Status: COMPLETED | OUTPATIENT
Start: 2019-05-21 | End: 2019-05-21

## 2019-05-21 RX ORDER — OXYCODONE HYDROCHLORIDE 5 MG/1
5 TABLET ORAL ONCE
Status: COMPLETED | OUTPATIENT
Start: 2019-05-21 | End: 2019-05-21

## 2019-05-21 RX ADMIN — SODIUM CHLORIDE 500 ML: 0.9 INJECTION, SOLUTION INTRAVENOUS at 01:05

## 2019-05-21 RX ADMIN — MIDAZOLAM HYDROCHLORIDE 1 MG: 1 INJECTION, SOLUTION INTRAMUSCULAR; INTRAVENOUS at 06:05

## 2019-05-21 RX ADMIN — MIDAZOLAM HYDROCHLORIDE 1 MG: 1 INJECTION, SOLUTION INTRAMUSCULAR; INTRAVENOUS at 05:05

## 2019-05-21 RX ADMIN — FENTANYL CITRATE 50 MCG: 50 INJECTION, SOLUTION INTRAMUSCULAR; INTRAVENOUS at 06:05

## 2019-05-21 RX ADMIN — FENTANYL CITRATE 50 MCG: 50 INJECTION, SOLUTION INTRAMUSCULAR; INTRAVENOUS at 05:05

## 2019-05-21 RX ADMIN — IOHEXOL 20 ML: 300 INJECTION, SOLUTION INTRAVENOUS at 06:05

## 2019-05-21 RX ADMIN — OXYCODONE HYDROCHLORIDE 5 MG: 5 TABLET ORAL at 07:05

## 2019-05-21 NOTE — H&P
Radiology History & Physical      SUBJECTIVE:     Chief Complaint: IVC filter no longer needed.     History of Present Illness:  Trisha Ferguson is a 33 y.o. female who presents for IVC filter removal.     Past Medical History:   Diagnosis Date    Cervical dysplasia     Cyst, ovarian     pt reported    Gonorrhea     Morbid obesity      Past Surgical History:   Procedure Laterality Date    CERVICAL BIOPSY      CERVIX SURGERY       SECTION, CLASSIC      CHOLECYSTECTOMY           DILATION AND CURETTAGE OF UTERUS      EGD (ESOPHAGOGASTRODUODENOSCOPY) N/A 2013    Performed by [facility]ro Anderson Jr., MD at [facility] OR [address] [place] [address]    EGD (ESOPHAGOGASTRODUODENOSCOPY) N/A 2013    Performed by Froylan Graves MD at [facility] ENDO (2[facility])    GASTRECTOMY, SLEEVE, LAPAROSCOPIC N/A 2013    Performed by Isidro Anderson Jr., MD at [facility] OR [address] [place] [address]    gastric sleeve  .13    icd filter      INSERTION-FILTER-INFERIOR VENA CAVA N/A 2013    Performed by Dejuan Malik MD at [facility] OR [address] [place] [address]       Home Meds:   Prior to Admission medications    Not on File     Anticoagulants/Antiplatelets: no anticoagulation    Allergies: Review of patient's allergies indicates:  No Known Allergies  Sedation History:  have not been any systemic reactions    Review of Systems:   Hematological: no known coagulopathies  Respiratory: no shortness of breath  Cardiovascular: no chest pain  Gastrointestinal: no abdominal pain  Genito-Urinary: no dysuria  Musculoskeletal: negative  Neurological: negative.       OBJECTIVE:     Vital Signs (Most Recent)  Temp: 98.1 °F (36.7 °C) (19 1326)  Pulse: 77 (19 1326)  Resp: 19 (19 1326)  BP: (!) 116/59 (19 1327)  SpO2: 100 % (19 1326)    Physical Exam:  ASA: 2  Mallampati: 3    General: no acute distress  Mental Status: alert and oriented to person, place and time  HEENT: normocephalic, atraumatic  Chest: unlabored breathing  Heart:  regular heart rate  Abdomen: nondistended  Extremity: moves all extremities    Laboratory  Lab Results   Component Value Date    INR 0.9 05/09/2019       Lab Results   Component Value Date    WBC 8.37 05/09/2019    HGB 12.8 05/09/2019    HCT 40.9 05/09/2019    MCV 86 05/09/2019     (H) 05/09/2019      Lab Results   Component Value Date    GLU 77 05/09/2019     05/09/2019    K 4.1 05/09/2019     05/09/2019    CO2 27 05/09/2019    BUN 11 05/09/2019    CREATININE 0.8 05/09/2019    CALCIUM 9.2 05/09/2019    MG 2.0 11/27/2013    ALT 11 05/09/2019    AST 16 05/09/2019    ALBUMIN 3.3 (L) 05/09/2019    BILITOT 0.6 05/09/2019    BILIDIR 0.3 10/11/2013       ASSESSMENT/PLAN:     Sedation Plan: moderate sedation.   Patient will undergo IVC filter removal.    Jeannine Mcgee MD   PGY-2 Radiology.

## 2019-05-21 NOTE — PROGRESS NOTES
Pt arrived via stretcher on RA to [facility], pt aao x's4, report received from Pauline SANTOS

## 2019-05-21 NOTE — PROGRESS NOTES
Called IR to find out how much longer until the pt will go for her procedure.  Got an estimated wait of another 1.5 hr.  Notified the pt.

## 2019-05-21 NOTE — PROGRESS NOTES
IVC filter removal completed, pt tolerated well. No apparent distress noted. Dressing applied CDI. Pt to be transferred to [facility], report to be given at bedside.

## 2019-05-21 NOTE — PROGRESS NOTES
Pt arrived to IR room 190 for IVC filter removal, no acute distress noted. Orders, consent and labs reviewed on chart.

## 2019-05-22 NOTE — NURSING
Pt received d/c instructions, piv dcd, pts mother @ bs, transport sent for, pt dc'd to home via WC in NAD

## 2019-05-22 NOTE — DISCHARGE SUMMARY
Radiology Discharge Summary      Hospital Course: No complications    Admit Date: 5/21/2019  Discharge Date: 05/21/2019     Instructions Given to Patient: Yes  Diet: Resume prior diet  Activity: activity as tolerated and no driving for today    Description of Condition on Discharge: Stable  Vital Signs (Most Recent): Temp: 98.8 °F (37.1 °C) (05/21/19 1830)  Pulse: 75 (05/21/19 1845)  Resp: 16 (05/21/19 1845)  BP: (!) 141/68 (05/21/19 1845)  SpO2: 100 % (05/21/19 1845)    Discharge Disposition: Home    Discharge Diagnosis: IVC filter s/p removal. Follow up as scheduled.    Nick Marks M.D.  Diagnostic and Interventional Radiologist  Department of Radiology  Pager: [phone]

## 2019-05-22 NOTE — PROCEDURES
Radiology Post-Procedure Note    Pre Op Diagnosis: IVC filter    Post Op Diagnosis: Same    Procedure: IVC removal    Procedure performed by: Nick Marks MD    Written Informed Consent Obtained: Yes  Specimen Removed: YES IVC retrieval  Estimated Blood Loss: Minimal    Findings:   Access was obtained to rt IJ with US. Venogram was obtained which did not show stenosis or thrombosis. IVC filter retrieved without complication. Hemostasis achieved in right neck. No complication.    Patient tolerated procedure well.    Nick Marks M.D.  Diagnostic and Interventional Radiologist  Department of Radiology  Pager: [phone]

## 2019-05-27 ENCOUNTER — HOSPITAL ENCOUNTER (EMERGENCY)
Facility: HOSPITAL | Age: 34
Discharge: HOME | End: 2019-05-27
Attending: EMERGENCY MEDICINE
Payer: MEDICAID

## 2019-05-27 VITALS
DIASTOLIC BLOOD PRESSURE: 74 MMHG | TEMPERATURE: 99 F | OXYGEN SATURATION: 98 % | BODY MASS INDEX: 59.07 KG/M2 | SYSTOLIC BLOOD PRESSURE: 126 MMHG | WEIGHT: 293 LBS | HEIGHT: 59 IN | HEART RATE: 83 BPM | RESPIRATION RATE: 18 BRPM

## 2019-05-27 DIAGNOSIS — R10.10 UPPER ABDOMINAL PAIN: Primary | ICD-10-CM

## 2019-05-27 LAB
ALBUMIN SERPL BCP-MCNC: 3.7 G/DL (ref 3.5–5.2)
ALP SERPL-CCNC: 47 U/L (ref 55–135)
ALT SERPL W/O P-5'-P-CCNC: 16 U/L (ref 10–44)
ANION GAP SERPL CALC-SCNC: 10 MMOL/L (ref 8–16)
AST SERPL-CCNC: 20 U/L (ref 10–40)
B-HCG UR QL: NEGATIVE
BASOPHILS # BLD AUTO: 0.01 K/UL (ref 0–0.2)
BASOPHILS NFR BLD: 0.1 % (ref 0–1.9)
BILIRUB SERPL-MCNC: 0.4 MG/DL (ref 0.1–1)
BILIRUB UR QL STRIP: NEGATIVE
BUN SERPL-MCNC: 9 MG/DL (ref 6–20)
CALCIUM SERPL-MCNC: 8.9 MG/DL (ref 8.7–10.5)
CHLORIDE SERPL-SCNC: 105 MMOL/L (ref 95–110)
CLARITY UR: ABNORMAL
CO2 SERPL-SCNC: 25 MMOL/L (ref 23–29)
COLOR UR: YELLOW
CREAT SERPL-MCNC: 0.8 MG/DL (ref 0.5–1.4)
CTP QC/QA: YES
DIFFERENTIAL METHOD: ABNORMAL
EOSINOPHIL # BLD AUTO: 0.1 K/UL (ref 0–0.5)
EOSINOPHIL NFR BLD: 0.7 % (ref 0–8)
ERYTHROCYTE [DISTWIDTH] IN BLOOD BY AUTOMATED COUNT: 14.7 % (ref 11.5–14.5)
EST. GFR  (AFRICAN AMERICAN): >60 ML/MIN/1.73 M^2
EST. GFR  (NON AFRICAN AMERICAN): >60 ML/MIN/1.73 M^2
GLUCOSE SERPL-MCNC: 103 MG/DL (ref 70–110)
GLUCOSE UR QL STRIP: NEGATIVE
HCT VFR BLD AUTO: 38.8 % (ref 37–48.5)
HGB BLD-MCNC: 12.3 G/DL (ref 12–16)
HGB UR QL STRIP: ABNORMAL
KETONES UR QL STRIP: NEGATIVE
LEUKOCYTE ESTERASE UR QL STRIP: NEGATIVE
LIPASE SERPL-CCNC: 10 U/L (ref 4–60)
LYMPHOCYTES # BLD AUTO: 2.2 K/UL (ref 1–4.8)
LYMPHOCYTES NFR BLD: 27 % (ref 18–48)
MCH RBC QN AUTO: 27.7 PG (ref 27–31)
MCHC RBC AUTO-ENTMCNC: 31.7 G/DL (ref 32–36)
MCV RBC AUTO: 87 FL (ref 82–98)
MICROSCOPIC COMMENT: ABNORMAL
MONOCYTES # BLD AUTO: 0.8 K/UL (ref 0.3–1)
MONOCYTES NFR BLD: 10.1 % (ref 4–15)
NEUTROPHILS # BLD AUTO: 5.1 K/UL (ref 1.8–7.7)
NEUTROPHILS NFR BLD: 62.1 % (ref 38–73)
NITRITE UR QL STRIP: NEGATIVE
PH UR STRIP: 7 [PH] (ref 5–8)
PLATELET # BLD AUTO: 329 K/UL (ref 150–350)
PMV BLD AUTO: 9.6 FL (ref 9.2–12.9)
POTASSIUM SERPL-SCNC: 4.4 MMOL/L (ref 3.5–5.1)
PROT SERPL-MCNC: 7.5 G/DL (ref 6–8.4)
PROT UR QL STRIP: NEGATIVE
RBC # BLD AUTO: 4.44 M/UL (ref 4–5.4)
RBC #/AREA URNS HPF: 40 /HPF (ref 0–4)
SODIUM SERPL-SCNC: 140 MMOL/L (ref 136–145)
SP GR UR STRIP: 1.02 (ref 1–1.03)
SQUAMOUS #/AREA URNS HPF: 4 /HPF
URN SPEC COLLECT METH UR: ABNORMAL
UROBILINOGEN UR STRIP-ACNC: NEGATIVE EU/DL
WBC # BLD AUTO: 8.23 K/UL (ref 3.9–12.7)
WBC #/AREA URNS HPF: 2 /HPF (ref 0–5)

## 2019-05-27 PROCEDURE — 25500020 PHARM REV CODE 255: Performed by: EMERGENCY MEDICINE

## 2019-05-27 PROCEDURE — 63600175 PHARM REV CODE 636 W HCPCS: Performed by: EMERGENCY MEDICINE

## 2019-05-27 PROCEDURE — 81025 URINE PREGNANCY TEST: CPT | Performed by: NURSE PRACTITIONER

## 2019-05-27 PROCEDURE — S0028 INJECTION, FAMOTIDINE, 20 MG: HCPCS | Performed by: EMERGENCY MEDICINE

## 2019-05-27 PROCEDURE — 96361 HYDRATE IV INFUSION ADD-ON: CPT

## 2019-05-27 PROCEDURE — 85025 COMPLETE CBC W/AUTO DIFF WBC: CPT

## 2019-05-27 PROCEDURE — 83690 ASSAY OF LIPASE: CPT

## 2019-05-27 PROCEDURE — 80053 COMPREHEN METABOLIC PANEL: CPT

## 2019-05-27 PROCEDURE — 96375 TX/PRO/DX INJ NEW DRUG ADDON: CPT

## 2019-05-27 PROCEDURE — 96374 THER/PROPH/DIAG INJ IV PUSH: CPT

## 2019-05-27 PROCEDURE — 81000 URINALYSIS NONAUTO W/SCOPE: CPT

## 2019-05-27 PROCEDURE — 25000003 PHARM REV CODE 250: Performed by: EMERGENCY MEDICINE

## 2019-05-27 PROCEDURE — 99284 EMERGENCY DEPT VISIT MOD MDM: CPT | Mod: 25

## 2019-05-27 RX ORDER — MORPHINE SULFATE 10 MG/ML
4 INJECTION INTRAMUSCULAR; INTRAVENOUS; SUBCUTANEOUS
Status: COMPLETED | OUTPATIENT
Start: 2019-05-27 | End: 2019-05-27

## 2019-05-27 RX ORDER — PANTOPRAZOLE SODIUM 40 MG/1
40 TABLET, DELAYED RELEASE ORAL
Status: COMPLETED | OUTPATIENT
Start: 2019-05-27 | End: 2019-05-27

## 2019-05-27 RX ORDER — FAMOTIDINE 10 MG/ML
20 INJECTION INTRAVENOUS
Status: COMPLETED | OUTPATIENT
Start: 2019-05-27 | End: 2019-05-27

## 2019-05-27 RX ORDER — ONDANSETRON 2 MG/ML
8 INJECTION INTRAMUSCULAR; INTRAVENOUS
Status: COMPLETED | OUTPATIENT
Start: 2019-05-27 | End: 2019-05-27

## 2019-05-27 RX ADMIN — FAMOTIDINE 20 MG: 10 INJECTION INTRAVENOUS at 11:05

## 2019-05-27 RX ADMIN — IOHEXOL 75 ML: 350 INJECTION, SOLUTION INTRAVENOUS at 08:05

## 2019-05-27 RX ADMIN — MORPHINE SULFATE 4 MG: 10 INJECTION INTRAVENOUS at 08:05

## 2019-05-27 RX ADMIN — ONDANSETRON 8 MG: 2 INJECTION INTRAMUSCULAR; INTRAVENOUS at 08:05

## 2019-05-27 RX ADMIN — PANTOPRAZOLE SODIUM 40 MG: 40 TABLET, DELAYED RELEASE ORAL at 11:05

## 2019-05-27 RX ADMIN — SODIUM CHLORIDE 1000 ML: 0.9 INJECTION, SOLUTION INTRAVENOUS at 08:05

## 2019-05-27 RX ADMIN — LIDOCAINE HYDROCHLORIDE: 20 SOLUTION ORAL; TOPICAL at 09:05

## 2019-05-27 NOTE — ED PROVIDER NOTES
Encounter Date: 2019  SORT MSE:  Pt is a 33 y.o. female who presents for emergent consideration for abd pain near umbilicus. Pt will be moved to room when one is available, otherwise will wait in waiting room with triage nurse supervision.  Pt arrived by ambulatory. She is not in distress. Orders have been placed. IRINA Arrington DNP ACNP-BC 2019 6:54 PM        History   No chief complaint on file.    CC: Abdominal Pain     HPI: This 33 y.o F with a hx of Morbid obesity presents to the ED c/o vom and LUQ abdominal pain with associated nausea since 1500h today. The pt ate wings and fries from a restaurant prior to the onset of her symptoms. She notes that no one else who ate the wings and fries is experiencing similar symptoms. She does report several episodes of pink emesis (pepto bismol) while waiting to be seen, noting that she attempted tx with Pepto Bismol. Her last regular BM was 45 minutes ago. The pt is s/p IVC filter removal (19) with no complications. The IVC filter was placed prior to her gastric bypass for precautionary measures.  She denies fever, chills, diaphoresis, diarrhea, chest pain, SOB, dysuria and rash. She does not smoke marijuana and denies recent EtOH consumption.       PSHx:  Cholecystectomy; Cervix surgery;  section, classic; gastric sleeve (11.25.13); Dilation and curettage of uterus; Cervical biopsy; icd filter; and IVC filter retrieval (N/A, 2019).          Review of patient's allergies indicates:  No Known Allergies  Past Medical History:   Diagnosis Date    Cervical dysplasia     Cyst, ovarian     pt reported    Gonorrhea     Morbid obesity      Past Surgical History:   Procedure Laterality Date    CERVICAL BIOPSY      CERVIX SURGERY       SECTION, CLASSIC      CHOLECYSTECTOMY           DILATION AND CURETTAGE OF UTERUS      EGD (ESOPHAGOGASTRODUODENOSCOPY) N/A 2013    Performed by Isidro Anderson Jr., [facility] at [facility] [place] [address] [place] [address]     EGD (ESOPHAGOGASTRODUODENOSCOPY) N/A 2013    Performed by Froylan Graves MD at [facility] ENDO (2ND FLR)    GASTRECTOMY, SLEEVE, LAPAROSCOPIC N/A 2013    Performed by Isidro Anderson Jr., MD at [facility] OR 2ND FLR    gastric sleeve  .25.13    icd filter      INSERTION-FILTER-INFERIOR VENA CAVA N/A 2013    Performed by Dejuan Malik MD at [facility] OR 2ND FLR    REMOVAL-FILTER-IVC N/A 2019    Performed by [facility] Diagnostic Provider at [facility] OR 2ND FLR     Family History   Problem Relation Age of Onset    Diabetes Maternal Grandmother          of MI at 63     Heart disease Maternal Grandmother     Hypertension Maternal Grandmother     Stroke Maternal Grandmother     Obesity Maternal Grandmother     Obesity Mother          at 26 years of Gun shot wound    Diabetes Mother     Diabetes Father      Social History     Tobacco Use    Smoking status: Never Smoker    Smokeless tobacco: Never Used   Substance Use Topics    Alcohol use: No    Drug use: No     Review of Systems   Constitutional: Negative for chills, diaphoresis and fever.   HENT: Negative for rhinorrhea and sore throat.    Eyes: Negative for redness.   Respiratory: Negative for cough and shortness of breath.    Cardiovascular: Negative for chest pain.   Gastrointestinal: Positive for abdominal pain, nausea and vomiting. Negative for diarrhea.   Genitourinary: Negative for dysuria, frequency and urgency.   Musculoskeletal: Negative for back pain and neck pain.   Skin: Negative for rash.   Psychiatric/Behavioral: The patient is not nervous/anxious.        Physical Exam     Initial Vitals   BP Pulse Resp Temp SpO2   -- -- -- -- --      MAP       --         Physical Exam    Nursing note and vitals reviewed.  Constitutional: She appears well-developed and well-nourished.   HENT:   Head: Normocephalic and atraumatic.   Eyes: EOM are normal. Pupils are equal, round, and reactive to light.   Neck: Normal range of motion.  Neck supple.   Cardiovascular: Normal rate, regular rhythm and normal heart sounds. Exam reveals no gallop and no friction rub.    No murmur heard.  Pulmonary/Chest: Breath sounds normal. No respiratory distress. She has no wheezes. She has no rhonchi. She has no rales. She exhibits no tenderness.   Abdominal: Soft.   Patient morbidly morbidly morbidly obese.  No tenderness to palpation.  She is actively vomiting. No blood   Musculoskeletal: Normal range of motion.   Neurological: She is alert and oriented to person, place, and time. She has normal strength. GCS score is 15. GCS eye subscore is 4. GCS verbal subscore is 5. GCS motor subscore is 6.   Skin: Skin is warm and dry.   Psychiatric: She has a normal mood and affect.         ED Course   Procedures  Labs Reviewed - No data to display  EKG Readings: (Independently Interpreted)   Rhythm: Normal Sinus Rhythm. Ectopy: No Ectopy. Conduction: Normal. ST Segments: Normal ST Segments. T Waves: Normal. Clinical Impression: Normal Sinus Rhythm   Normal sinus rhythm. No STEMI.  No change from previous.       Imaging Results    None                       Attending Attestation:             Attending ED Notes:   Patient with history of gastric sleeve.  Abdominal pain and vomiting.  Will do IV blood work IV fluids labs IV pain medication IV Zofran.    Patient's pain improved at time of discharge. She reports only reports some slight burning.  She is tolerating p.o..  We will start her on Pepcid Protonix twice a day an hour before breakfast an hour before dinner.  She has no cardiac risk factors.  No chest pain or shortness of breath.             Clinical Impression:  Abdominal pain, vomiting   No diagnosis found.                             Trina Valderrama MD  05/27/19 1932       Trina Valderrama MD  05/27/19 2130       Trina Valderrama MD  05/27/19 2150       Trina Valderrama MD  05/27/19 2151

## 2019-05-28 NOTE — DISCHARGE INSTRUCTIONS
Pepcid Protonix twice a day as directed.  1 hr before breakfast 1 hr before dinner.  Maalox or Mylanta for breakthrough pain. Please follow up with Gastroenterology.  Please advance diet as tolerated.  Please keep a food journal.

## 2019-05-28 NOTE — ED TRIAGE NOTES
Abdominal Pain: Patient complains of abdominal pain. The pain is described as aching and burning, and is 8/10 in intensity. Pain is located in the diffusely without radiation. Onset was a few hours ago. Symptoms have been gradually worsening since.

## 2019-06-20 ENCOUNTER — TELEPHONE (OUTPATIENT)
Dept: BARIATRICS | Facility: CLINIC | Age: 34
End: 2019-06-20

## 2019-06-20 ENCOUNTER — PATIENT MESSAGE (OUTPATIENT)
Dept: BARIATRICS | Facility: CLINIC | Age: 34
End: 2019-06-20

## 2019-06-20 NOTE — TELEPHONE ENCOUNTER
Spoke with Ms Trisha Ferguson about her getting paperwork on a Gastric sleeve she had in 2013. Provided Medical record number to the patient.

## 2019-06-20 NOTE — TELEPHONE ENCOUNTER
----- Message from Nj Cole sent at 6/20/2019 12:28 PM CDT -----  Contact: Pt  Needs Advice    Reason for call:The Pt had a Gastric Sleeve on 11/26/13 and she would like to have the surgery notes from the procedure.        Communication Preference:[phone]    Additional Information:The Pt would like a call back about it.

## 2019-06-20 NOTE — TELEPHONE ENCOUNTER
Spoke with Ms. Marty rinaldi informed her. She will need to contact medical records to get information regarding her Chart.

## 2019-11-12 ENCOUNTER — HOSPITAL ENCOUNTER (EMERGENCY)
Facility: HOSPITAL | Age: 34
Discharge: HOME | End: 2019-11-12
Attending: EMERGENCY MEDICINE
Payer: MEDICAID

## 2019-11-12 VITALS
HEART RATE: 92 BPM | WEIGHT: 293 LBS | OXYGEN SATURATION: 100 % | BODY MASS INDEX: 59.07 KG/M2 | TEMPERATURE: 98 F | RESPIRATION RATE: 19 BRPM | DIASTOLIC BLOOD PRESSURE: 64 MMHG | SYSTOLIC BLOOD PRESSURE: 134 MMHG | HEIGHT: 59 IN

## 2019-11-12 DIAGNOSIS — J02.0 STREP PHARYNGITIS: Primary | ICD-10-CM

## 2019-11-12 DIAGNOSIS — R52 GENERALIZED BODY ACHES: ICD-10-CM

## 2019-11-12 LAB
CTP QC/QA: YES
DEPRECATED S PYO AG THROAT QL EIA: POSITIVE
POC MOLECULAR INFLUENZA A AGN: NEGATIVE
POC MOLECULAR INFLUENZA B AGN: NEGATIVE

## 2019-11-12 PROCEDURE — 63600175 PHARM REV CODE 636 W HCPCS: Mod: JG | Performed by: PHYSICIAN ASSISTANT

## 2019-11-12 PROCEDURE — 96372 THER/PROPH/DIAG INJ SC/IM: CPT

## 2019-11-12 PROCEDURE — 87880 STREP A ASSAY W/OPTIC: CPT

## 2019-11-12 PROCEDURE — 25000003 PHARM REV CODE 250: Performed by: PHYSICIAN ASSISTANT

## 2019-11-12 PROCEDURE — 87502 INFLUENZA DNA AMP PROBE: CPT

## 2019-11-12 PROCEDURE — 99284 EMERGENCY DEPT VISIT MOD MDM: CPT | Mod: 25

## 2019-11-12 RX ORDER — ACETAMINOPHEN 500 MG
500 TABLET ORAL EVERY 4 HOURS PRN
Qty: 20 TABLET | Refills: 0 | Status: SHIPPED | OUTPATIENT
Start: 2019-11-12 | End: 2019-11-17

## 2019-11-12 RX ORDER — BENZONATATE 100 MG/1
100 CAPSULE ORAL
Status: COMPLETED | OUTPATIENT
Start: 2019-11-12 | End: 2019-11-12

## 2019-11-12 RX ORDER — BENZONATATE 100 MG/1
100 CAPSULE ORAL 3 TIMES DAILY PRN
Qty: 20 CAPSULE | Refills: 0 | Status: SHIPPED | OUTPATIENT
Start: 2019-11-12 | End: 2019-11-19

## 2019-11-12 RX ORDER — ACETAMINOPHEN 500 MG
500 TABLET ORAL
Status: COMPLETED | OUTPATIENT
Start: 2019-11-12 | End: 2019-11-12

## 2019-11-12 RX ADMIN — BENZONATATE 100 MG: 100 CAPSULE ORAL at 12:11

## 2019-11-12 RX ADMIN — ACETAMINOPHEN 500 MG: 500 TABLET ORAL at 12:11

## 2019-11-12 RX ADMIN — PENICILLIN G BENZATHINE 1.2 MILLION UNITS: 1200000 INJECTION, SUSPENSION INTRAMUSCULAR at 01:11

## 2019-11-12 NOTE — ED PROVIDER NOTES
"Encounter Date: 2019       History     Chief Complaint   Patient presents with    Generalized Body Aches     " My body is achy and my throat has been hurting since last night".      CC: Generalized Body Aches    HPI:   33 y/o female with no pertinent past medical history presenting for evaluation of subjective fever, chills, myalgias, sore throat, that began yesterday since last night. Additionally c/o productive cough with yellow sputum. She has had some mild periumbilical abdominal pain intermittently x 1 day. Tylenol attempted tx at 0800. Denies nasal congestion,rhinorrhea, nausea, vomiting, diarrhea, difficulty breathing or swallowing, dysuria, hematuria, urinary urgency or frequency.          Review of patient's allergies indicates:  No Known Allergies  Past Medical History:   Diagnosis Date    Cervical dysplasia     Cyst, ovarian     pt reported    Gonorrhea     Morbid obesity      Past Surgical History:   Procedure Laterality Date    CERVICAL BIOPSY      CERVIX SURGERY       SECTION, CLASSIC      CHOLECYSTECTOMY           DILATION AND CURETTAGE OF UTERUS      gastric sleeve  11.25.13    icd filter      IVC FILTER RETRIEVAL N/A 2019    Procedure: REMOVAL-FILTER-IVC;  Surgeon: Yuliya Diagnostic Provider;  Location: [facility] OR [address] [place] [address];  Service: Anesthesiology;  Laterality: N/A;  Room 189/[facility]     Family History   Problem Relation Age of Onset    Diabetes Maternal Grandmother          of MI at 63     Heart disease Maternal Grandmother     Hypertension Maternal Grandmother     Stroke Maternal Grandmother     Obesity Maternal Grandmother     Obesity Mother          at 26 years of Gun shot wound    Diabetes Mother     Diabetes Father      Social History     Tobacco Use    Smoking status: Never Smoker    Smokeless tobacco: Never Used   Substance Use Topics    Alcohol use: No    Drug use: No     Review of Systems   Constitutional: Positive for chills and " fever.   HENT: Negative for congestion, ear pain, rhinorrhea and trouble swallowing.    Eyes: Negative for redness.   Respiratory: Positive for cough.    Gastrointestinal: Positive for abdominal pain. Negative for diarrhea and vomiting.   Genitourinary: Negative for difficulty urinating, dysuria, frequency, hematuria and urgency.   Musculoskeletal: Positive for myalgias. Negative for back pain and neck pain.   Skin: Negative for rash.   Neurological: Negative for speech difficulty.   Psychiatric/Behavioral: Negative for confusion.       Physical Exam     Initial Vitals [11/12/19 1222]   BP Pulse Resp Temp SpO2   135/83 100 20 98.3 °F (36.8 °C) 100 %      MAP       --         Physical Exam    Nursing note and vitals reviewed.  Constitutional: She appears well-developed and well-nourished.   HENT:   Head: Normocephalic.   Right Ear: Hearing, tympanic membrane, external ear and ear canal normal.   Left Ear: Hearing, tympanic membrane, external ear and ear canal normal.   Nose: Nose normal.   Mouth/Throat: Uvula is midline and mucous membranes are normal. Oropharyngeal exudate, posterior oropharyngeal edema and posterior oropharyngeal erythema present.   Eyes: Conjunctivae are normal.   Cardiovascular: Normal rate and regular rhythm. Exam reveals no gallop and no friction rub.    No murmur heard.  Pulmonary/Chest: Breath sounds normal. She has no wheezes. She has no rhonchi. She has no rales.   Abdominal: Soft. Bowel sounds are normal. She exhibits no distension. There is no tenderness. There is no rebound, no guarding, no tenderness at McBurney's point and negative Rolon's sign.   Musculoskeletal: Normal range of motion.   Lymphadenopathy:     She has cervical adenopathy.        Right cervical: Superficial cervical adenopathy present.        Left cervical: Superficial cervical adenopathy present.   Neurological: She is alert. She has normal strength. No cranial nerve deficit or sensory deficit.   Skin: Skin is warm  and dry.   Psychiatric: She has a normal mood and affect.         ED Course   Procedures  Labs Reviewed   THROAT SCREEN, RAPID - Abnormal; Notable for the following components:       Result Value    Rapid Strep A Screen Positive (*)     All other components within normal limits   POCT INFLUENZA A/B MOLECULAR          Imaging Results    None          Medical Decision Making:   Initial Assessment:   35 y/o female with no pertinent past medical history presenting for evaluation 1 history of fever, chills sore throat active cough and generalized body.  Patient is afebrile nontoxic.  No distress. Exam above.  Considered retropharyngeal or peritonsillar abscess, airway compromise.  Influenza swab is negative. Rapid strep is positive.  Patient common emergency department and 1 dose of Bicillin for strep pharyngitis.  Will discharge patient Tylenol.  She has history of gastric sleeve.  Will avoid NSAIDs.  Tessalon for cough.  Lungs clear.  Doubt pneumonia.  Will have her follow up with primary care in 2 days or return emergency department for worsening symptoms or as needed.                                 Clinical Impression:       ICD-10-CM ICD-9-CM   1. Strep pharyngitis J02.0 034.0   2. Generalized body aches R52 780.96                             Maral Knowles PA-C  11/12/19 2195

## 2019-11-12 NOTE — ED NOTES
Past Medical History:   Diagnosis Date    Cervical dysplasia     Cyst, ovarian     pt reported    Gonorrhea     Morbid obesity      Pt presenting with co generalized body aches, pt reports the symptoms came on last evening and she took Tylenol this am at 0800. Reports pain 8/10,  Pt reports a cough productive yellow mucous since yesterday.   Denies NVFD, but reports chills.

## 2019-11-12 NOTE — DISCHARGE INSTRUCTIONS
You were treated with antibiotic shot for strep throat today. Take Tylenol for pain as needed and Tessalon for cough. Follow up with primary care in 2 days. Return to ER for worsening symptoms, difficulty breathing or swallowing or as needed.

## 2020-01-14 ENCOUNTER — TELEPHONE (OUTPATIENT)
Dept: BARIATRICS | Facility: CLINIC | Age: 35
End: 2020-01-14

## 2020-01-17 ENCOUNTER — INITIAL CONSULT (OUTPATIENT)
Dept: BARIATRICS | Facility: CLINIC | Age: 35
End: 2020-01-17
Payer: MEDICAID

## 2020-01-17 VITALS
HEIGHT: 59 IN | WEIGHT: 293 LBS | DIASTOLIC BLOOD PRESSURE: 80 MMHG | HEART RATE: 78 BPM | BODY MASS INDEX: 59.07 KG/M2 | SYSTOLIC BLOOD PRESSURE: 132 MMHG

## 2020-01-17 DIAGNOSIS — E66.01 CLASS 3 SEVERE OBESITY DUE TO EXCESS CALORIES WITH SERIOUS COMORBIDITY AND BODY MASS INDEX (BMI) OF 60.0 TO 69.9 IN ADULT: Primary | ICD-10-CM

## 2020-01-17 DIAGNOSIS — Z98.84 S/P LAPAROSCOPIC SLEEVE GASTRECTOMY: ICD-10-CM

## 2020-01-17 PROCEDURE — 99214 OFFICE O/P EST MOD 30 MIN: CPT | Mod: S$PBB,,, | Performed by: INTERNAL MEDICINE

## 2020-01-17 PROCEDURE — 99999 PR PBB SHADOW E&M-EST. PATIENT-LVL III: ICD-10-PCS | Mod: PBBFAC,,, | Performed by: INTERNAL MEDICINE

## 2020-01-17 PROCEDURE — 99214 PR OFFICE/OUTPT VISIT, EST, LEVL IV, 30-39 MIN: ICD-10-PCS | Mod: S$PBB,,, | Performed by: INTERNAL MEDICINE

## 2020-01-17 PROCEDURE — 99213 OFFICE O/P EST LOW 20 MIN: CPT | Mod: PBBFAC | Performed by: INTERNAL MEDICINE

## 2020-01-17 PROCEDURE — 99999 PR PBB SHADOW E&M-EST. PATIENT-LVL III: CPT | Mod: PBBFAC,,, | Performed by: INTERNAL MEDICINE

## 2020-01-17 RX ORDER — PHENTERMINE HYDROCHLORIDE 37.5 MG/1
TABLET ORAL
Qty: 30 TABLET | Refills: 2 | Status: SHIPPED | OUTPATIENT
Start: 2020-01-17 | End: 2021-03-04 | Stop reason: SDUPTHER

## 2020-01-17 NOTE — PROGRESS NOTES
"Subjective:       Patient ID: Laquail Eva Ferguson is a 34 y.o. female.    Chief Complaint: Consult    Pt here today for follow-up. Has gained 66 lbs since I saw her for consult in 2017. Rxed modified liquid diet with progression to Continue bariatric diet for life and diethylpropion.   checked today. She does not recall how she did with it. Has been eating more sweets, chips, and soda in the past several months.       Patient Active Problem List:     Morbid obesity     Numbness of leg     Pain in limb     S/P laparoscopic sleeve gastrectomy     Dermatitis     Elevated LFTs     Morbid obesity with BMI of 45.0-49.9, adult     4 SAB in 2nd trimester since last seen. States not currently sexually active, but not on contraception. Not currently ttc.            Previous diet attempts: The patient has tried [facility] Diet, Adipex, exercise, low carbohydrate with high protein, no meat dieting, and cabbage soup diet.  She reports that she was able to lose about 100 pounds with Adipex, high protein and low carbohydrate in 2008.     Heaviest weight: 378 before surgery.         History of medication for loss:  Phentermine before surgery. Did have some heart racing.              Review of Systems   Constitutional: Negative for chills and fever.   Respiratory: Negative for apnea and shortness of breath.    Cardiovascular: Negative for chest pain and leg swelling.   Gastrointestinal: Negative for constipation and diarrhea.        Denies GERD   Genitourinary: Negative for difficulty urinating and menstrual problem.   Musculoskeletal: Negative for arthralgias and back pain.   Neurological: Negative for dizziness and light-headedness.   Psychiatric/Behavioral: Negative for dysphoric mood. The patient is not nervous/anxious.        Objective:     /80   Pulse 78   Ht 4' 11" (1.499 m)   Wt (!) 157.1 kg (346 lb 6.4 oz)   LMP  (LMP Unknown)   BMI 69.96 kg/m²     Physical Exam   Constitutional: She is oriented to " person, place, and time. She appears well-developed and well-nourished. No distress.   HENT:   Head: Normocephalic and atraumatic.   Eyes: Pupils are equal, round, and reactive to light. EOM are normal. No scleral icterus.   Neck: Normal range of motion. Neck supple. No thyromegaly present.   Cardiovascular: Normal rate.   Pulmonary/Chest: Effort normal.   Musculoskeletal: Normal range of motion. She exhibits no edema.   Neurological: She is alert and oriented to person, place, and time. No cranial nerve deficit.   Skin: Skin is warm and dry. No erythema.   Psychiatric: She has a normal mood and affect. Her behavior is normal. Judgment normal.   Vitals reviewed.      Assessment:       1. Class 3 severe obesity due to excess calories with serious comorbidity and body mass index (BMI) of 60.0 to 69.9 in adult    2. S/P laparoscopic sleeve gastrectomy        Plan:         Trisha was seen today for consult.    Diagnoses and all orders for this visit:    Class 3 severe obesity due to excess calories with serious comorbidity and body mass index (BMI) of 60.0 to 69.9 in adult  -     phentermine (ADIPEX-P) 37.5 mg tablet; 1/2 tab -1 tab po q am.    S/P laparoscopic sleeve gastrectomy      Patient warned of common side effects of phentermine including anxiety, insomnia, palpitations and increased blood pressure. It was also explained that it is for short-term usage along with diet and exercise, and that stopping the medication without making lifestyle changes will result in regain of weight. Patient states understanding.     Weight loss medications are controlled substances.  They require routine follow up. Prescription or pills that are lost or destroyed will not be replaced.       Start phentermine with 1/2 pill a day for at least 2 weeks to see if that will control your appetite.  Go up to a full pill when needed.      - To lose weight you want to cut 100% starchy carbohydrates out of your diet (bread, rice, pasta,  potatoes, granola, flour, corn, peas, oatmeal, grits, tortillas, crackers, chips) and get  grams of protein.  Aim for 100 grams of protein and 1200 calories daily.    - No soda, sweet tea, juices or lemonade. All drinks should be 5 calories or less.       - Exercise daily    Also suggested to pt that she look into some type of counseling or support for grief/depression as the weight gain may be an outward sign of this.   1200 av menu and 30 min recipes given.

## 2020-01-17 NOTE — PATIENT INSTRUCTIONS
Patient warned of common side effects of phentermine including anxiety, insomnia, palpitations and increased blood pressure. It was also explained that it is for short-term usage along with diet and exercise, and that stopping the medication without making lifestyle changes will result in regain of weight. Patient states understanding.     Weight loss medications are controlled substances.  They require routine follow up. Prescription or pills that are lost or destroyed will not be replaced.       Start phentermine with 1/2 pill a day for at least 2 weeks to see if that will control your appetite.  Go up to a full pill when needed.      - To lose weight you want to cut 100% starchy carbohydrates out of your diet (bread, rice, pasta, potatoes, granola, flour, corn, peas, oatmeal, grits, tortillas, crackers, chips) and get  grams of protein.  Aim for 100 grams of protein and 1200 calories daily.    - No soda, sweet tea, juices or lemonade. All drinks should be 5 calories or less.       - Exercise daily    - Premier Protein (Chocolate, Bananas & Cream, Strawberries & Cream, Vanilla) [employer] or [employer]    - Syntrax [place] from [employer], www.bariatricadvantage.com, www.bariatricchoice.com. (LACTOSE FREE)    - Tinteo - Amazon.com (LACTOSE FREE)    - Veggetti Pro from [employer], [employer], [employer]    - www.pinterest.com (cauliflower, cloud bread, quest bar cookies, eggplant, zucchini, zucchini noodles, crustless quiche, no carb meals, taco lettuce boats)    - http://rajendra.[employer]/    Sample meal plan  80-120g protein; 2750-7504 calories  Protein drinks and bars: 0-4 grams sugar  Drink lots of water throughout the day and exercise!  MENU # 1  Breakfast: 2 eggs, 1 turkey sausage tamiko  Lunch: 2-3 roll-ups (sliced turkey, low-fat slice cheese), baby carrots dipped in 1 Tbsp natural peanut butter  Mid-Day Snack: ¼ cup unsalted almonds, ½ cup fruit  Supper: 1 chicken thigh simmered in  tomato sauce + 2 Tbsp mozzarella cheese, ½ cup black beans, 1/2 cup steamed veggies  Evening Snack: 1/2 cup grapes with 4 cubes low-fat cheddar cheese   ___________________________________________________  MENU # 2  Breakfast: protein drink  Mid-morning snack : ¼ cup unsalted nuts  Lunch: 1 cup tuna or chicken salad made with light bal, over salad.   Supper: hamburger tamiko, slice of cheese, 1 cup steamed veggies.   Snack: light yogurt      Menu #3  Breakfast: 6oz plain Greek yogurt + fruit (½ banana, ½ cup fruit - pineapple, blueberries, strawberries, peach), may add Splenda to rene.  Lunch: ½  chicken breast w/ slice pepper wilfrido cheese, 1/2 cup steamed veggies and small salad with Salad Spritzer.    Mid-Day snack: protein drink   Supper: 4oz Grilled fish, grilled veggie kabob ( mushrooms, onion, bell pepper, yellow squash, zucchini, cherry tomatoes)  Evening Snack: fudgsicle no-sugar-added    Menu # 4  Breakfast: vanilla iced coffee: 1 scoop vanilla protein powder + 4oz skim milk + 4oz coffee   Snack: protein bar  Lunch: 2 Lettuce tacos: ¼ cup seasoned ground turkey wrapped in a Edgar lettuce leaf with 1 Tbsp shredded cheese and dollop low-fat sour cream  Dinner: Shrimp omelet: 2 eggs, ½ cup shrimp, green onions, and shredded cheese        Menu #5  Breakfast: 1 cup low-fat cottage cheese, ½ cup peaches (no sugar added)  Lunch: 2 oz baked pork chop, 1 cup okra and tomato stew  Snack: 1 cup black beans + salsa + dollop sour cream  Dinner: Caprese chicken salad: 2 oz chicken, 1oz fresh mozzarella, sliced tomato, 1 Tbsp olive oil, basil  Snack: sugar-free pudding cup      Menu #6  Breakfast: ½ cup part-skim ricotta cheese, 2 Tbsp sugar-free strawberry preserves, ¼ cup slivered almonds  Lunch: 2 oz canned chicken, 1oz shredded cheddar cheese, ½ cup black beans, 2 Tbsp salsa  Snack: Protein drink  Dinner: 4 oz grilled salmon steak, over mixed green salad with light dressing      Dinner in Under 30 minutes and 400  calories.     Baked Chicken breast with Broccoli Cheese Casserole  2-3 chicken breast (approximately 6-8 oz each)    2 tsp each garlic and herb Mrs. Pavan seasoning   2 tsp your favorite creole seasoning  2 tablespoons of balsamic vinegar  2 - 10 oz packages of frozen broccoli  1 egg   ½ cup skim milk  ½ tsp paprika   ½ tsp cayenne pepper   1 can fat free cream of mushroom soup.   1 .5 cups of shredded cheddar cheese    Pre-heat oven to 450 degrees. Toss 2-3 chicken breast (approximately 6-8 oz each) in 2 tsp each garlic and herb Mrs. Dash seasoning, 2 tsp your favorite creole seasoning, and 2 tablespoons of balsamic vinegar. This can also be done up to 24 hours ahead of time, and the chicken can be left in the refrigerator to marinate. Place on a baking sheet sprayed with non-stick cooking spray and bake on 450 degrees for 10 min, then reduce heat to 350 degrees and cook an addition 10-15 minutes until chicken is cooked through.   While chicken is baking, microwave safe dish, thaw 2 - 10 oz packages of frozen broccoli. Drain any excess water, season with salt, pepper, all-purpose seasoning of your choice. In another bowl, whisk together 1 egg, ½ cup skim milk, ½ tsp paprika, ½ tsp cayenne pepper and 1 can fat free cream of mushroom soup. Combine broccoli, soup mixture, 1 cup of shredded cheddar cheese in baking dish sprayed with cooking spray. Top with remaining cheese. Bake in the oven with chicken for about 20 min or until set cheese is melted and craft.     Makes 4 servings. 389 calories per serving.10 g fat, 13 g carbs, 54g protein     Sheet Pan [place] Shrimp  1 pound large shrimp, shelled, peeled and deveined.   2 tablespoon olive oil  The zest and the juice of 1 lime  ½ tsp chili powder  ½-1 tsp cayenne pepper  1 tsp cumin  ½ tsp dry oregano  1 red bell pepper  1 green bell pepper  1 red onion  2 cups mushrooms, quartered  1 avocado  Whole ruel lettuce leaves  Preheat oven to 375 degrees.  In a bowl  combine shrimp, lime juice, lime zest, cumin, cayenne pepper, chili powder, and a pinch of salt. Set aside.   Slice peppers and onions into thin strips. Toss in 1 tablespoon of olive oil. Sprinkle with salt and pepper and arrange in a single layer over 1/3 of a large sheet pan  Toss mushrooms with remaining olive oil, oregano, salt and pepper. Arrange in single layer on sheet pan. Place sheet pan in oven for about 15-20 minutes until vegetables are softened, cooked about ¾ of the way through. Remove the sheet pan from oven.  Change setting on oven to low broil.  If needed, rearrange vegetables to make room for shrimp. Arrange the shrimp in a single layer on the sheet pan and return pan to oven. After 5 minutes, flip shrimp. Cook 3-5 additional minutes, or until  Shrimp are opaque.   Serve shrimp and cooked vegetables on lettuce leaves with sliced avocado.   Makes 4 servings. Calories per servin; 23g fat, 19 g carbohydrates, 27g protein.    Zoodles Primavera with Seasoned Ricotta  8 cups zucchini noodles. These can be purchased already prepared, or you can make them on your own with whole zucchini and a vegetable spiralizer.   1.5 cups cherry tomatoes, quartered  2 cups sliced mushrooms  1 cup chopped fresh broccoli  1 cup frozen peas and carrots  2 cloves garlic, finely chopped  16 oz part skim ricotta cheese  2 oz Neufchatel cream cream cheese, cut into small cubes  Juice and zest of 1 lemon  1 pinch red pepper flakes    2 tsp Italian seasoning  4-5 leaves fresh basil, thinly sliced  1 tablespoon of olive oil  Parmesan cheese for topping (optional)     In a bowl, combine ricotta cheese, 1 tsp Italian seasoning, ½ teaspoon lemon zest. Season with salt and pepper to taste. Mix well. Fold in half of fresh basil. Set aside.   Heat a large skillet to medium high. Add olive oil. Sautee mushrooms until starting to become tender. Season them with salt and pepper while cooking.  Add broccoli and peas and carrots.  Reduce heat to medium. Cover pan and cook for 4-5 minutes until broccoli is tender and peas and carrots are warmed through. Add Neufchatel cheese, Italian seasoning, red pepper flakes, 1 tsp lemon zest and lemon juice. Stir until a smooth sauce is formed. Add zucchini noodles (zoodles) to skillet and toss in sauce, then add cherry tomatoes.  Separate zoodle and vegetables into 4 equal portions. Serve each with a ¼ cup serving of seasoned ricotta cheese. Sprinkle with grated parmesan cheese or fresh basil,  if desired.   Makes 4 servings. Calories per servin calories, 32 g carbs, 33 g protein, 18 g fat

## 2020-01-21 ENCOUNTER — CLINICAL SUPPORT (OUTPATIENT)
Dept: BARIATRICS | Facility: CLINIC | Age: 35
End: 2020-01-21
Payer: MEDICAID

## 2020-01-21 VITALS — WEIGHT: 293 LBS | BODY MASS INDEX: 68.57 KG/M2

## 2020-01-21 DIAGNOSIS — E66.01 MORBID OBESITY WITH BMI OF 60.0-69.9, ADULT: ICD-10-CM

## 2020-01-21 DIAGNOSIS — Z98.84 S/P LAPAROSCOPIC SLEEVE GASTRECTOMY: ICD-10-CM

## 2020-01-21 DIAGNOSIS — Z71.3 DIETARY COUNSELING: ICD-10-CM

## 2020-01-21 PROCEDURE — 99499 UNLISTED E&M SERVICE: CPT | Mod: S$PBB,,, | Performed by: DIETITIAN, REGISTERED

## 2020-01-21 PROCEDURE — 99999 PR PBB SHADOW E&M-EST. PATIENT-LVL II: ICD-10-PCS | Mod: PBBFAC,,, | Performed by: DIETITIAN, REGISTERED

## 2020-01-21 PROCEDURE — 97802 MEDICAL NUTRITION INDIV IN: CPT | Mod: PBBFAC | Performed by: DIETITIAN, REGISTERED

## 2020-01-21 PROCEDURE — 99499 NO LOS: ICD-10-PCS | Mod: S$PBB,,, | Performed by: DIETITIAN, REGISTERED

## 2020-01-21 PROCEDURE — 99212 OFFICE O/P EST SF 10 MIN: CPT | Mod: PBBFAC | Performed by: DIETITIAN, REGISTERED

## 2020-01-21 PROCEDURE — 99999 PR PBB SHADOW E&M-EST. PATIENT-LVL II: CPT | Mod: PBBFAC,,, | Performed by: DIETITIAN, REGISTERED

## 2020-01-21 NOTE — PROGRESS NOTES
"NUTRITIONAL CONSULT    Referring Physician: Isidro Anderson M.D.   Reason for MNT Referral: Follow up sleeve gastrectomy 2013 with weight regain    PAST MEDICAL HISTORY:   34 y.o. female  Body mass index is 68.57 kg/m².     Pre op highest weight was 385 lbs. Lost weight during work up for surgery. Pre op weight 346 lbs. Lowest weight after surgery 205 lbs, Jan 2015. 2016 and 2017 still on track with bariatric diet; 3 pregnancies with losses; weight gain up to 280 lbs. 2018 and 2019 admits to getting off track with diet; emotional eating; sweets/chips/candy/cake/sodas/no exercise.    Starting 3 weeks ago: getting back on track with protein drinks, no sweets and junk, low carb. Visit with Dr. Joe last week to start medication. Lost 7 lbs.    She states that by May she wants to be under 300 lbs. She knows it is a lot to lose in 4 months, but she states she is committed to this goal.    Long term goal wt 230-240 lbs.    Past Medical History:   Diagnosis Date    Cervical dysplasia     Cyst, ovarian     pt reported    Gonorrhea     Morbid obesity        CLINICAL DATA:  34 y.o.-year-old Black or  female.  Height: 4 ft. 11 in.  Weight: 339 lbs  BMI: 68.5    NUTRITIONAL NEEDS: Bariatric Diet  800-1000 Calories    Grams Protein    NUTRITION & HEALTH HISTORY:  Greatest challenge: sweets, starchy CHO, snacking at night and emotional eating    Current diet recall:     Premier shake  Premier shake  D: "crack slaw" ground turkey, shredded cabbage, soy sauce, vinegar OR 2oz steak and asparagus and corn    Current Diet:  Meal pattern: 1 meal + 1-2 snacks + 2 protein supplements  Protein supplements: Premier shakes  Snacking: cheese whisps, pistachios  Vegetables: Likes a variety. Eats daily.  Fruits: Likes a few. Eats almost daily. Berries on salads.  Beverages: water, black coffee  Dining out: Reduced lately.   Cooking at home: Daily. Mostly baked, grilled and smothered meat, fish and " vegetables.    Exercise:  Past exercise: Fair    Current exercise: None  Restrictions to exercise: time management. Working 3 jobs.    Vitamins / Minerals / Herbs:   None      Labs:   no recent    Food Allergies:   None known    Social:  Works regular daytime shifts. 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  Lives with her .   Grocery shopping and food prep done by the pt.  Patient believes her  will be supportive after surgery.  Alcohol: None.  Smoking: None.    ASSESSMENT:  · Patient reports attempts at weight loss, only to regain lost weight.  · Patient demonstrated knowledge of healthy eating behaviors and exercise patterns; admits to not eating healthy and not exercising at this point.  ·   Patient states willingness and demonstrates willingness to change lifestyle and make behavior modifications as evidenced by weight loss, dietary changes, including protein drinks, increased vegetables, reduced dining out, more food preparation at [employer], healthier cooking at home, bringing snacks to work, healthier snacking at work and healthier snacking at home.    Barriers to Education: none    Stage of change: action    NUTRITION DIAGNOSIS:    Obesity related to Excessive carbohydrate intake and Physical inactivity as evidence by BMI.    BARIATRIC DIET DISCUSSION/PLAN:  Reviewed bariatric nutrition guidelines.  Answered all questions.  Increase exercise.  Start shopping for bariatric vitamins & minerals. List of suggestions provided.    RTC in 2 months for weigh in and f/u of diet plan.    Patient verbalized understanding.    Communicated nutrition plan with bariatric team.    SESSION TIME:  60 minutes

## 2020-01-21 NOTE — PATIENT INSTRUCTIONS
Menu Plan: 800-1000 Calories;  grams of Protein    DAY 1     Breakfast  ½ cup 2% cottage cheese  ¼ cup fruit (no sugar added)    Snack  2% mozzarella string cheese  10 grapes    Lunch  2oz Lean hamburger or turkey jin  1 slice low-fat cheese  ¼ cup green beans    Snack  200 calorie low-carb protein drink (4 grams sugar or less)    Dinner  2oz chicken thigh  ¼ cup cooked spinach     Snack  Atkins bar (15g protein)      DAY 2    Breakfast  1 egg with 1oz shredded cheddar cheese and 2T salsa    Snack  200 calorie low-carb protein drink (4 grams sugar or less)    Lunch  Lettuce Wraps: 2oz sliced turkey, 1 slice low-fat Swiss cheese, tomato, and mustard wrapped in a Edgar lettuce leaf    Snack  ½ cup low-fat cottage cheese  Pear cup (no sugar added)    Dinner  2oz baked fish  ½ cup cooked broccoli    Snack  Sugar-free pudding cup      DAY 3    Breakfast   ½ cup low-fat ricotta cheese w/ Splenda to rene  ½ scoop Vanilla protein powder   ¼ cup fresh fruit    Snack  2% string cheese  6 unsalted almonds    Lunch  Tuna/Chicken Salad: 2oz canned tuna/chicken, 1 egg white, and 1 tsp light bal  Pineapple cup (no sugar added)    Snack  200 calorie low-carb protein drink (4 grams sugar or less)    Dinner  ½ baked pork chop   ¼ cup beans      DAY 4    Breakfast  200 calorie low-carb protein drink (4 grams sugar or less)    Snack  Boiled egg    Lunch  ½ cup grilled shrimp  Salad w/ 2 tbsp crumbled fat-free feta  1 tbsp light vinaigrette    Snack  200 calorie low-carb protein drink (4 grams sugar or less)    Dinner  ¾ cup red beans    Snack  Mini Babybell light      DAY 5    Breakfast  [place] pie: 3oz Greek yogurt, 1 tbsp Splenda, ½ individual pack Crystal Light lemonade. Top with ¼ cup chopped walnuts     Snack  3-4 lean ham or turkey slices, ¼ - ½ cup fruit    Lunch  Fiesta Chicken: 2oz canned chicken, 1oz shredded cheddar cheese, ¼ cup black beans  Top with 2 tbsp salsa and a small dollop light sour  cream    Snack  200 calorie low-carb protein drink (4 grams sugar or less)    Dinner  Omelette: ¼ cup Egg Beaters, 4 large (1oz) shrimp, 1oz shredded low-fat cheese. Add bell pepper, onion, mushrooms, green onions, or salsa, optional.      DAY 6    Breakfast  1 tamiko or 2 links turkey sausage  ½ cup fruit    Snack  200 calorie low-carb protein drink (4 grams sugar or less)    Lunch  Grilled tilapia  Salad of baby spinach leaves with light dressing    Snack  200 calorie low-carb protein drink (4 grams sugar or less)    Dinner  Chicken thigh simmered in 98% fat free cream of mushroom soup  ½ cup cooked green beans    Meal Ideas for Regular Bariatric Diet  *Recipes and products available at www.bariatriceating.com      Breakfast: (15-20g protein)    - Egg white omelet: 2 egg whites or ½ cup Egg Beaters. (Optional proteins: cheese, shrimp, black beans, chicken, sliced turkey) (Optional veggies: tomatoes, salsa, spinach, mushrooms, onions, green peppers, or small slice avocado)     - Egg and sausage: 1 egg or ¼ cup Egg Beaters (any variety), with 1 tamiko or 2 links of Turkey sausage or Veggie breakfast sausage ([employer] or [employer])    - Crust-less breakfast quiche: To make a glass pie dish, mix 4oz part skim Ricotta, 1 cup skim milk, and 2 eggs as your base. Add protein: shredded cheese, sliced lean ham or turkey, turkey martinez/sausage. Add veggies: tomato, onion, green onion, mushroom, green pepper, spinach, etc.    - Yogurt parfait: Mix 1 - 6oz container Dannon Light N Fit vanilla yogurt, with ¼ cup crushed unsalted nuts    - Cottage cheese and fruit: ½ cup part-skim cottage cheese or ricotta cheese topped with fresh fruit or sugar free preserves     - Kika Gayle's Vanilla Egg custard* (add 2 Tbsp instant coffee granules to make Cappuccino Custard*)    - Hi-Protein café latte (skim milk, decaf coffee, 1 scoop protein powder). Optional to add Sugar free syrup or extract flavoring.    - Breakfast Lox: spread fat  free cream cheese on slices of smoked salmon. Serve over scrambled or egg over easy (sauteed with nonstick cookspray) OR on a cucumber slice    - Eggwhich: Scramble or cook 1 large egg over easy using nonstick cookspray. Place between 2 slices of Austrian martinez and low fat cheese.     Lunch: (20-30g protein)    - ½ cup Black bean soup (Homemade or Progresso), with ¼ cup shredded low-fat cheese. Top with chopped tomato or fresh salsa.     - Lean deli turkey breast and low-fat sliced cheese, mustard or light bal to moisten, rolled up together, or wrapped in a Edgar lettuce leaf    - Chicken salad made from dinner leftovers, moisten with low-fat salad dressing or light bal. Also try leftover salmon, shrimp, tuna or boiled eggs. Serve ½ cup over dark green salad    - Fat-free canned refried beans, topped with ¼ cup shredded low-fat cheese. Top with chopped tomato or fresh salsa.     - Greek salad: Top mixed greens with 1-2oz grilled chicken, tomatoes, red onions, 2-3 kalamata olives, and sprinkle lightly with feta cheese. Spritz with Balsamic vinegar to taste.     - Crust-less lunch quiche: To make a glass pie dish, mix 4oz part skim Ricotta, 1 cup skim milk, and 2 eggs as your base. Add protein: shredded cheese, sliced lean ham or turkey, shrimp, chicken. Add veggies: tomato, onion, green onion, mushroom, green pepper, spinach, artichoke, broccoli, etc.    - Pizza bake: spread a  nigel candace mushroom with tomato sauce, low-fat shredded mozzarella and turkey pepperoni or [place] martinez. Add any veggies. Roast for 10-15 minutes, until cheese melted.     - Cucumber crab bites: Spread ¼ cup crab dip (lump crabmeat + light cream cheese and green onions) over sliced cucumber.     - Chicken with light spinach and artichoke dip*: Puree in : 6oz cooked and drained spinach, 2 cloves garlic, 1 can cannelloni beans, ½ cup chopped green onions, 1 can drained artichoke hearts (not marinated in oil), lemon juice and  basil. Mix in 2oz chopped up chicken.    Supper: (20-30g protein)    - Serve grilled fish over dark green salad tossed with low-fat dressing, served with grilled asparagus padron     - Rotisserie chicken salad: served with sliced strawberries, walnuts, fat-free feta cheese crumbles and 1 tbsp Gores Own Light Raspberry Bath Vinaigrette    - Shrimp cocktail: Dip cold boiled shrimp in homemade low-sugar cocktail sauce (1/2 cup Donn One Carb ketchup, 2 tbsp horseradish, 1/4 tsp hot sauce, 1 tsp Worcestershire sauce, 1 tbsp freshly-squeezed lemon juice). Serve with dark green salad, walnuts, and crumbled blue cheese drizzled with olive oil and Balsamic vinegar    - Tuna Melt: Spread tuna salad onto 2 thick slices of tomato. Top with low-fat cheese and broil until cheese is melted. May also be made with chicken salad of shrimp salad. Betances with different types of cheeses.    - Chicken or beef fajitas (no tortilla, rice, beans, chips). Top meat and veggies w/ fresh salsa, fat free sour cream.     - Homemade low-fat Chili using extra lean ground beef or ground turkey. Top with shredded cheese and salsa as desired. May add dollop fat-free sour cream if desired    - Chicken parmesan: Top chicken breast w/ low sugar marinara sauce, mozzarella cheese and bake until chicken reaches 165*.  Serve w/ spaghetti SQUASH or Bangladeshi cut green beans    - Dinner Omelet with shrimp or chicken and onion, green peppers and chives.    - No noodle lasagna: Use sliced zucchini or eggplant in place of noodles.  Layer with part skim ricotta cheese and low sugar meat sauce (use very lean ground beef or ground turkey).    - Mexican chicken bake: Bake chunks of chicken breast or thigh with taco seasoning, Pace brand enchilada sauce, green onions and low-fat cheese. Serve with ¼ cup black beans or fat free refried beans topped with chopped tomatoes or salsa.    - Loreta frozen meatballs, simmered in Classico Marinessa sauce. Different  flavors of salsa or spaghetti sauce create different dishes! Sprinkle with parmesan cheese. Serve with grilled or steamed veggies, or a dark green salad.    - Simmer boneless skinless chicken thigh chunks in Classico Marinara sauce or roasted salsa until tender with chopped onion, bell pepper, garlic, mushrooms, spinach, etc.     - Hamburger or veggie burger, without the bun, dressed the way you like. Served with grilled or steamed veggies.    - Eggplant parmesan: Bake slices of eggplant at 350 degrees for 15 minutes. Layer tomato sauce, sliced eggplant and low-fat mozzarella cheese in a baking dish and cover with foil. Bake 30-40 more minutes or until bubbly. Uncover and bake at 400 degrees for about 15 more minutes, or until top is slightly crisp.    - Fish tacos: grilled/baked white fish, wrapped in Edgar lettuce leaf, topped with salsa, shredded low-fat cheese, and light coleslaw.    - Chicken jesus: Sprinkle chicken w/ 1 tsp of hidden valley ranch dip mix. Then grill chicken and top with black beans, salsa and 1 tsp fat free sour cream.     - Cauliflower pizza crust: Use cauliflower as crust (see recipe on [facility], no flour!). Top w/ low fat cheese, turkey pepperoni and veggies and bake again    - chicken or turkey crust pizza: use ground chicken or turkey instead of cauliflower, spread in [place] and bake at 350 for about 20-30 minutes(may want to add garlic, black pepper, oregano and other herbs to ground meat mixture).  Remove and top w/ low fat cheese, turkey pepperoni and veggies and bake again for another 10 minutes or until cheese is browned.     Snacks: (100-200 calories; >5g protein)    - 1 low-fat cheese stick with 8 cherry tomatoes or 1 serving fresh fruit  - 4 thin slices fat-free turkey breast and 1 slice low-fat cheese  - 4 thin slices fat-free honey ham with wedge of melon  - 6-8 edamame pods (equivalent to about 1/4 cup edamame without pods).   - 1/4 cup unsalted nuts with ½ cup fruit  -  6-oz container Dannon Light n Fit vanilla yogurt, topped with 1oz unsalted nuts         - apple, celery or baby carrots spread with 2 Tbsp PB2  - apple slices with 1 oz slice low-fat cheese  - Apple slices dipped in 2 Tbsp of PB2  - celery, cucumber, bell pepper or baby carrots dipped in ¼ cup hummus bean spread or light spinach and artichoke dip (*recipe in lunch section)  - celery, cucumber, baby carrots dipped in high protein greek yogurt (Mix 16 oz plain greek yogurt + 1 packet of hidden valley ranch dip mix)  - Kian Links Beef Steak - 14g protein! (similar to beef jerky)  - 2 wedges Laughing Cow - Light Herb & Garlic Cheese with sliced cucumber or green bell pepper  - 1/2 cup low-fat cottage cheese with ¼ cup fruit or ¼ cup salsa  - RTD Protein drinks: Atkins, Low Carb Slim Fast, EAS light, Muscle Milk Light, etc.  - Homemade Protein drinks: [facility] Soy95, Isopure, Nectar, UNJURY, Whey Gourmet, etc. Mix 1 scoop powder with 8oz skim/1% milk or light soymilk.  - Protein bars: Atkins, EAS, Pure Protein, Think Thin, Detour, etc. Must have 0-4 grams sugar - Read the label.    Takeout Options: No more than twice/week  Deli - Salads (no pasta or rice), meats, cheeses. Roasted chicken. Lox (salmon)    Mexican - Platters which don't include tortillas, chips, or rice. Go easy on the beans. Example: Fajitas without the tortillas. Ask the  not to bring chips to the table if they are too tempting.    Greek - Meat or fish and vegetable, but no bread or rice. Including hummus, baba ganoush, etc, is OK. Most sit-down Greek restaurants can provide you with cucumber slices for dipping instead of leeann bread.    Fast Food (Avoid as much as possible) - Salads (no croutons and limit salad dressing to 2 tbsp), grilled chicken sandwich without the bun and ask for no bal. Antonias low fat chili or Taco Bell pintos and cheese.    BBQ - The meats are fine if you ask for sauces on the side, but most of the traditional side dishes are  loaded with carbs. Mo slaw, baked beans and BBQ sauce are typically made with sugar.    Chinese - Nothing deep-fried, no rice or noodles. Many Chinese sauces have starch and sugar in them, so you'll have to use your judgement. If you find that these sauces trigger cravings, or cause Dumping, you can ask for the sauce to be made without sugar or just use soy sauce.    Required Vitamin/Mineral Supplements:    1. Multivitamins - one taken twice a day       2. Iron- 18 mg total daily (may be included in multivitamin)     3. Super B-complex with 50 mg Thiamine (Vitamin B1)- once daily  4. Calcium Citrate + Vitamin D- 500 mg three times per day  5. Vitamin B12- 500 mcg sublingual daily or monthly injections     Sleeve Gastrectomy: No swallowing large whole vitamins/minerals for 2 weeks after surgery  Gastric Bypass: No swallowing large whole vitamins/minerals for 1 month after surgery    *NO gummy multivitamins due to poor quality and sugar content.  *Do NOT take calcium citrate and Iron within 2 hours of each other due to poor absorption.  *Pills may be swallowed if the size of a No. 2 pencil eraser (or smaller). They may be cut to this size with a pill cutter and swallowed if tolerated. Compare to this size:        Suggested Bariatric Vitamins:      o Alive! Women's Ultra Potency, 2/day ([employer] or [employer])  o Calcium Citrate tablets ([employer]) or Celebrate soft chews, 3/day ([email or web address])  o Sublingual Vitamin B12 (under the tongue) 500mcg daily ([employer])

## 2020-03-16 ENCOUNTER — PATIENT MESSAGE (OUTPATIENT)
Dept: BARIATRICS | Facility: CLINIC | Age: 35
End: 2020-03-16

## 2020-04-11 ENCOUNTER — HOSPITAL ENCOUNTER (EMERGENCY)
Facility: HOSPITAL | Age: 35
Discharge: HOME | End: 2020-04-11
Attending: EMERGENCY MEDICINE
Payer: MEDICAID

## 2020-04-11 VITALS
DIASTOLIC BLOOD PRESSURE: 78 MMHG | HEART RATE: 68 BPM | WEIGHT: 293 LBS | TEMPERATURE: 98 F | RESPIRATION RATE: 18 BRPM | HEIGHT: 59 IN | BODY MASS INDEX: 59.07 KG/M2 | OXYGEN SATURATION: 96 % | SYSTOLIC BLOOD PRESSURE: 121 MMHG

## 2020-04-11 DIAGNOSIS — R10.31 RIGHT LOWER QUADRANT ABDOMINAL PAIN: ICD-10-CM

## 2020-04-11 DIAGNOSIS — R10.33 PERIUMBILICAL ABDOMINAL PAIN: Primary | ICD-10-CM

## 2020-04-11 DIAGNOSIS — R11.0 NAUSEA: ICD-10-CM

## 2020-04-11 LAB
ALBUMIN SERPL BCP-MCNC: 3.4 G/DL (ref 3.5–5.2)
ALP SERPL-CCNC: 52 U/L (ref 55–135)
ALT SERPL W/O P-5'-P-CCNC: 13 U/L (ref 10–44)
ANION GAP SERPL CALC-SCNC: 12 MMOL/L (ref 8–16)
AST SERPL-CCNC: 19 U/L (ref 10–40)
B-HCG UR QL: NEGATIVE
BACTERIA #/AREA URNS HPF: NORMAL /HPF
BASOPHILS # BLD AUTO: 0.03 K/UL (ref 0–0.2)
BASOPHILS NFR BLD: 0.3 % (ref 0–1.9)
BILIRUB SERPL-MCNC: 0.8 MG/DL (ref 0.1–1)
BILIRUB UR QL STRIP: NEGATIVE
BUN SERPL-MCNC: 9 MG/DL (ref 6–20)
CALCIUM SERPL-MCNC: 8.5 MG/DL (ref 8.7–10.5)
CHLORIDE SERPL-SCNC: 102 MMOL/L (ref 95–110)
CLARITY UR: CLEAR
CO2 SERPL-SCNC: 24 MMOL/L (ref 23–29)
COLOR UR: ABNORMAL
CREAT SERPL-MCNC: 0.8 MG/DL (ref 0.5–1.4)
CTP QC/QA: YES
DIFFERENTIAL METHOD: ABNORMAL
EOSINOPHIL # BLD AUTO: 0.1 K/UL (ref 0–0.5)
EOSINOPHIL NFR BLD: 0.6 % (ref 0–8)
ERYTHROCYTE [DISTWIDTH] IN BLOOD BY AUTOMATED COUNT: 14.6 % (ref 11.5–14.5)
EST. GFR  (AFRICAN AMERICAN): >60 ML/MIN/1.73 M^2
EST. GFR  (NON AFRICAN AMERICAN): >60 ML/MIN/1.73 M^2
GLUCOSE SERPL-MCNC: 83 MG/DL (ref 70–110)
GLUCOSE UR QL STRIP: NEGATIVE
HCT VFR BLD AUTO: 37.7 % (ref 37–48.5)
HGB BLD-MCNC: 11.9 G/DL (ref 12–16)
HGB UR QL STRIP: NEGATIVE
IMM GRANULOCYTES # BLD AUTO: 0.03 K/UL (ref 0–0.04)
IMM GRANULOCYTES NFR BLD AUTO: 0.3 % (ref 0–0.5)
KETONES UR QL STRIP: NEGATIVE
LEUKOCYTE ESTERASE UR QL STRIP: NEGATIVE
LIPASE SERPL-CCNC: 7 U/L (ref 4–60)
LYMPHOCYTES # BLD AUTO: 2.7 K/UL (ref 1–4.8)
LYMPHOCYTES NFR BLD: 30.7 % (ref 18–48)
MCH RBC QN AUTO: 27.6 PG (ref 27–31)
MCHC RBC AUTO-ENTMCNC: 31.6 G/DL (ref 32–36)
MCV RBC AUTO: 88 FL (ref 82–98)
MICROSCOPIC COMMENT: NORMAL
MONOCYTES # BLD AUTO: 0.8 K/UL (ref 0.3–1)
MONOCYTES NFR BLD: 9.5 % (ref 4–15)
NEUTROPHILS # BLD AUTO: 5.2 K/UL (ref 1.8–7.7)
NEUTROPHILS NFR BLD: 58.6 % (ref 38–73)
NITRITE UR QL STRIP: POSITIVE
NRBC BLD-RTO: 0 /100 WBC
PH UR STRIP: 5 [PH] (ref 5–8)
PLATELET # BLD AUTO: 334 K/UL (ref 150–350)
PMV BLD AUTO: 9.2 FL (ref 9.2–12.9)
POTASSIUM SERPL-SCNC: 3.4 MMOL/L (ref 3.5–5.1)
PROT SERPL-MCNC: 7.1 G/DL (ref 6–8.4)
PROT UR QL STRIP: NEGATIVE
RBC # BLD AUTO: 4.31 M/UL (ref 4–5.4)
RBC #/AREA URNS HPF: 2 /HPF (ref 0–4)
SODIUM SERPL-SCNC: 138 MMOL/L (ref 136–145)
SP GR UR STRIP: 1.02 (ref 1–1.03)
SQUAMOUS #/AREA URNS HPF: 4 /HPF
URN SPEC COLLECT METH UR: ABNORMAL
UROBILINOGEN UR STRIP-ACNC: ABNORMAL EU/DL
WBC # BLD AUTO: 8.85 K/UL (ref 3.9–12.7)
WBC #/AREA URNS HPF: 1 /HPF (ref 0–5)

## 2020-04-11 PROCEDURE — 85025 COMPLETE CBC W/AUTO DIFF WBC: CPT

## 2020-04-11 PROCEDURE — 83690 ASSAY OF LIPASE: CPT

## 2020-04-11 PROCEDURE — 96372 THER/PROPH/DIAG INJ SC/IM: CPT | Mod: 59

## 2020-04-11 PROCEDURE — 87086 URINE CULTURE/COLONY COUNT: CPT

## 2020-04-11 PROCEDURE — 81025 URINE PREGNANCY TEST: CPT | Performed by: EMERGENCY MEDICINE

## 2020-04-11 PROCEDURE — 99285 EMERGENCY DEPT VISIT HI MDM: CPT | Mod: 25

## 2020-04-11 PROCEDURE — 96374 THER/PROPH/DIAG INJ IV PUSH: CPT | Mod: 59

## 2020-04-11 PROCEDURE — 63600175 PHARM REV CODE 636 W HCPCS: Performed by: PHYSICIAN ASSISTANT

## 2020-04-11 PROCEDURE — 81000 URINALYSIS NONAUTO W/SCOPE: CPT

## 2020-04-11 PROCEDURE — 63600175 PHARM REV CODE 636 W HCPCS: Performed by: NURSE PRACTITIONER

## 2020-04-11 PROCEDURE — 80053 COMPREHEN METABOLIC PANEL: CPT

## 2020-04-11 PROCEDURE — 25500020 PHARM REV CODE 255: Performed by: NURSE PRACTITIONER

## 2020-04-11 RX ORDER — DICYCLOMINE HYDROCHLORIDE 20 MG/1
20 TABLET ORAL 2 TIMES DAILY PRN
Qty: 10 TABLET | Refills: 0 | Status: SHIPPED | OUTPATIENT
Start: 2020-04-11 | End: 2020-05-11

## 2020-04-11 RX ORDER — ONDANSETRON 2 MG/ML
4 INJECTION INTRAMUSCULAR; INTRAVENOUS
Status: COMPLETED | OUTPATIENT
Start: 2020-04-11 | End: 2020-04-11

## 2020-04-11 RX ORDER — DICYCLOMINE HYDROCHLORIDE 10 MG/ML
20 INJECTION INTRAMUSCULAR
Status: COMPLETED | OUTPATIENT
Start: 2020-04-11 | End: 2020-04-11

## 2020-04-11 RX ORDER — ONDANSETRON 4 MG/1
4 TABLET, FILM COATED ORAL EVERY 8 HOURS PRN
Qty: 12 TABLET | Refills: 0 | Status: SHIPPED | OUTPATIENT
Start: 2020-04-11

## 2020-04-11 RX ADMIN — ONDANSETRON HYDROCHLORIDE 4 MG: 2 SOLUTION INTRAMUSCULAR; INTRAVENOUS at 06:04

## 2020-04-11 RX ADMIN — DICYCLOMINE HYDROCHLORIDE 20 MG: 20 INJECTION, SOLUTION INTRAMUSCULAR at 06:04

## 2020-04-11 RX ADMIN — IOHEXOL 100 ML: 350 INJECTION, SOLUTION INTRAVENOUS at 07:04

## 2020-04-11 NOTE — ED PROVIDER NOTES
"Encounter Date: 2020       History     Chief Complaint   Patient presents with    Abdominal Pain     Rt sided stabbing constant abdominal pain that started on yesterday. "This pain is unbearable". "My belly button hurts really bad, its sore and tender". +nausea without vomiting. Denies pain with urination. Hx of UTI    Nausea     CC:  Abdominal pain nausea    HPI: Trisha Ferguson, a 34 y.o. female presents to the ED with an acute onset of right lower quadrant abdominal pain that began yesterday.  She reports the pain was constant, severe, and she began having nausea.  She reports that this morning the pain has migrated more towards the umbilical abdomen.  She does have a history of a gastric sleeve, reports no complications from that to date.  She has been attempted treatment with OTC medications including Excedrin azo without relief of symptoms.  She reports no vomiting, diarrhea, vaginal bleeding, vaginal discharge, or other urinary symptoms.      The history is provided by the patient. No  was used.     Review of patient's allergies indicates:  No Known Allergies  Past Medical History:   Diagnosis Date    Cervical dysplasia     Cyst, ovarian     pt reported    Gonorrhea     Morbid obesity      Past Surgical History:   Procedure Laterality Date    CERVICAL BIOPSY      CERVIX SURGERY       SECTION, CLASSIC      CHOLECYSTECTOMY           DILATION AND CURETTAGE OF UTERUS      gastric sleeve  11.25.13    icd filter      IVC FILTER RETRIEVAL N/A 2019    Procedure: REMOVAL-FILTER-IVC;  Surgeon: Yuliya Diagnostic Provider;  Location: [facility] OR [address] [place] [address];  Service: Anesthesiology;  Laterality: N/A;  Room 189/[facility]     Family History   Problem Relation Age of Onset    Diabetes Maternal Grandmother          of MI at 63     Heart disease Maternal Grandmother     Hypertension Maternal Grandmother     Stroke Maternal Grandmother     Obesity Maternal " Grandmother     Obesity Mother          at 26 years of Gun shot wound    Diabetes Mother     Diabetes Father      Social History     Tobacco Use    Smoking status: Never Smoker    Smokeless tobacco: Never Used   Substance Use Topics    Alcohol use: No    Drug use: No     Review of Systems   Constitutional: Negative for fever.   HENT: Negative for congestion and trouble swallowing.    Respiratory: Negative for cough and shortness of breath.    Cardiovascular: Negative for chest pain.   Gastrointestinal: Positive for abdominal pain and nausea. Negative for vomiting.   Genitourinary: Negative for dysuria, vaginal bleeding and vaginal discharge.   Musculoskeletal: Negative for back pain and neck pain.   Skin: Negative for rash and wound.   Neurological: Negative for syncope, weakness and headaches.   Psychiatric/Behavioral: Negative for confusion.       Physical Exam     Initial Vitals [20 0616]   BP Pulse Resp Temp SpO2   (!) 147/91 79 20 98.4 °F (36.9 °C) 99 %      MAP       --         Physical Exam    Nursing note and vitals reviewed.  Constitutional: She appears well-developed and well-nourished. She is not diaphoretic. She is Obese . She is cooperative.  Non-toxic appearance. She does not have a sickly appearance. She does not appear ill. No distress.   HENT:   Head: Normocephalic and atraumatic.   Right Ear: External ear normal.   Left Ear: External ear normal.   Mouth/Throat: No trismus in the jaw.   Eyes: Conjunctivae and EOM are normal.   Neck: Normal range of motion. No tracheal deviation present.   Cardiovascular: Normal rate and regular rhythm.   Pulmonary/Chest: Effort normal. No stridor. No tachypnea and no bradypnea. No respiratory distress.   Abdominal: Soft. She exhibits no distension and no mass. There is tenderness in the periumbilical area and suprapubic area. There is no rigidity, no rebound and no guarding.   Musculoskeletal: Normal range of motion.   Neurological: She is alert  and oriented to person, place, and time. She has normal strength. No sensory deficit. Coordination normal. GCS score is 15. GCS eye subscore is 4. GCS verbal subscore is 5. GCS motor subscore is 6.   Skin: Skin is warm, dry and intact. No bruising and no rash noted. No cyanosis or erythema. Nails show no clubbing.   Psychiatric: She has a normal mood and affect. Her behavior is normal. Thought content normal.         ED Course   Procedures  Labs Reviewed   URINALYSIS, REFLEX TO URINE CULTURE - Abnormal; Notable for the following components:       Result Value    Nitrite, UA Positive (*)     Urobilinogen, UA 4.0-6.0 (*)     All other components within normal limits    Narrative:     Preferred Collection Type->Urine, Clean Catch   CBC W/ AUTO DIFFERENTIAL - Abnormal; Notable for the following components:    Hemoglobin 11.9 (*)     Mean Corpuscular Hemoglobin Conc 31.6 (*)     RDW 14.6 (*)     All other components within normal limits   COMPREHENSIVE METABOLIC PANEL - Abnormal; Notable for the following components:    Potassium 3.4 (*)     Calcium 8.5 (*)     Albumin 3.4 (*)     Alkaline Phosphatase 52 (*)     All other components within normal limits   CULTURE, URINE   LIPASE   URINALYSIS MICROSCOPIC    Narrative:     Preferred Collection Type->Urine, Clean Catch   POCT URINE PREGNANCY          Imaging Results          CT Abdomen Pelvis With Contrast (Final result)  Result time 04/11/20 08:02:51    Final result by Lefty Manley DO (04/11/20 08:02:51)                 Impression:      1. No acute pathology noted within the abdomen or pelvis.  2. Remaining findings as discussed above.      Electronically signed by: Lefty Manley DO  Date:    04/11/2020  Time:    08:02             Narrative:    EXAMINATION:  CT ABDOMEN PELVIS WITH CONTRAST    CLINICAL HISTORY:  RLQ pain, appendicitis suspected;    TECHNIQUE:  Low dose axial images, sagittal and coronal reformations were obtained from the lung bases to the pubic  symphysis following the IV administration of 100 mL of Omnipaque 350 and the oral administration of 30 mL of Omnipaque 350.    COMPARISON:  05/27/2019    FINDINGS:  ABDOMEN    Lung bases: Unremarkable    Liver/gallbladder/biliary: The liver demonstrates no focal abnormality. The gallbladder is surgically absent.  No biliary ductal dilation.    Pancreas: The pancreas is unremarkable in appearance.    Spleen: The spleen is not enlarged.    Adrenals: Unremarkable    Kidneys: The kidneys are equally perfused and demonstrate no solid masses.    Bowel/Mesentery: There is no evidence of bowel obstruction.  Postoperative changes associated with the stomach.  Normal appearing appendix noted.  Cannot exclude mild early diverticulosis of the sigmoid colon.  No mesenteric stranding or adenopathy.    Retroperitoneum: No adenopathy.  The aorta demonstrates a normal caliber.    PELVIS:    Genitourinary/Reproductive organs: Single presumed dropped surgical clip noted within the pelvis.    Adenopathy: None    Free Fluid: No free fluid    Osseus Structures/Soft tissues: No suspicious appearing osseus lesions. No significant soft tissue abnormality.                                       APC / Resident Notes:   This is an evaluation of a 34 y.o. female that presents to the Emergency Department for abdominal pain and nausea. Physical Exam shows a non-toxic, afebrile, and well appearing female.  There is tenderness palpation the periumbilical abdomen and area just inferior to the umbilicus.  No peritoneal signs or guarding. Vital signs are reassuring. If available, previous records reviewed. RESULTS:  UPT:  Negative.  Urinalysis without leukocytes or blood.  There are nitrites present, urobilinogen is 4 6.  One WBC with occasional bacteria with 4 squamous epithelials on microscopic.  Patient without urinary symptoms, nitrates potentially result of the azo that she took.  CBC with no leukocytosis.  H&H 11.9 and 37.7.  Normal platelet  count.  Mildly low K of 3.4, calcium 8.5, albumin 3.4, alk-phos 52.  Normal lipase.  CT abdomen pelvis with questionable early diverticulosis without diverticulitis.  Normal appendix.  No abnormal acute findings.  Patient will be advised of the potential early diverticulosis.    My overall impression is abdominal pain, nausea. I considered, but at this time, do not suspect  pregnancy, ectopic pregnancy, TOA, ovarian torsion, bowel obstruction, bowel perforation, complication with gastric sleeve, pyelonephritis, pancreatitis, cholecystitis.  With positive nitrites in urine will sign off culture rule out UTI.    D/C Meds:  Bentyl and Zofran. D/C Information:  [place] diet progress as tolerated. The diagnosis, treatment plan, instructions for follow-up and reevaluation with PCP as well as ED return precautions were discussed and understanding was verbalized. All questions or concerns have been addressed. This case was discussed with Dr. Diaz who is in agreement with my assessment and plan.  EILEEN Cortez, JOSE-C                ED Course as of Apr 11 0816   Sat Apr 11, 2020   0815 Reassessment:  Patient reports her symptoms are improving after the medications.  No vomiting in the ED. She was advised of the diverticulosis and the diverticulosis discharge instructions were given to the patient.  All questions answered.    [AF]      ED Course User Index  [AF] JOSE Wang                Clinical Impression:       ICD-10-CM ICD-9-CM   1. Periumbilical abdominal pain R10.33 789.05   2. Right lower quadrant abdominal pain R10.31 789.03   3. Nausea R11.0 787.02         Disposition:   Disposition: Discharged  Condition: Stable     ED Disposition Condition    Discharge Stable        ED Prescriptions     Medication Sig Dispense Start Date End Date Auth. Provider    dicyclomine (BENTYL) 20 mg tablet Take 1 tablet (20 mg total) by mouth 2 (two) times daily as needed (Abdominal Pain/Cramping). 10 tablet 4/11/2020 5/11/2020  Keo Jo, JOSE    ondansetron (ZOFRAN) 4 MG tablet Take 1 tablet (4 mg total) by mouth every 8 (eight) hours as needed for Nausea. 12 tablet 4/11/2020  JOSE Wang        Follow-up Information     Follow up With Specialties Details Why Contact Info    Your Primary Care Doctor  Schedule an appointment as soon as possible for a visit  Please call and schedule an appointment for follow up this week.     [facility]  Schedule an appointment as soon as possible for a visit  For Follow-Up, This Week, If you do not have a Primary Care Doctor [address] [place] [address]  [phone]      [facility] Emergency Medicine Go to  If symptoms worsen [address] [facility] [id number] [phone]                                     JOSE Wang  04/11/20 0816

## 2020-04-11 NOTE — ED TRIAGE NOTES
Pt reports right lower abdominal pain 8/10 and nausea that started yesterday afternoon around 2 PM.

## 2020-04-11 NOTE — DISCHARGE INSTRUCTIONS
There were findings of possible early diverticulosis on your CatScan. Please follow up with your primary care doctor for continued monitoring of this. There is no intervention/surgery necessary at this time.     § Please return to the Emergency Department for any new or worsening symptoms including: fever, chest pain, shortness of breath, loss of consciousness, dizziness, weakness, or any other concerns.     § Schedule an appointment for follow up with your Primary Care Doctor as soon as possible for a recheck of your symptoms. If you do not have one, you may contact the one listed on your discharge paperwork or you may also call the [facility] Appointment Desk at [phone] to schedule an appointment with one.     § Please take all medication as prescribed.  Bentyl as needed for abdominal pain or cramping.  Zofran as needed for nausea or vomiting.

## 2020-04-13 LAB — BACTERIA UR CULT: NORMAL

## 2020-05-19 ENCOUNTER — TELEPHONE (OUTPATIENT)
Dept: BARIATRICS | Facility: CLINIC | Age: 35
End: 2020-05-19

## 2020-05-21 ENCOUNTER — TELEPHONE (OUTPATIENT)
Dept: BARIATRICS | Facility: CLINIC | Age: 35
End: 2020-05-21

## 2020-05-21 ENCOUNTER — CLINICAL SUPPORT (OUTPATIENT)
Dept: BARIATRICS | Facility: CLINIC | Age: 35
End: 2020-05-21
Payer: MEDICAID

## 2020-05-21 DIAGNOSIS — E66.01 MORBID OBESITY WITH BODY MASS INDEX (BMI) OF 60.0 TO 69.9 IN ADULT: ICD-10-CM

## 2020-05-21 DIAGNOSIS — E66.01 MORBID OBESITY WITH BMI OF 45.0-49.9, ADULT: ICD-10-CM

## 2020-05-21 DIAGNOSIS — Z71.3 DIETARY COUNSELING: ICD-10-CM

## 2020-05-21 DIAGNOSIS — Z98.84 S/P LAPAROSCOPIC SLEEVE GASTRECTOMY: ICD-10-CM

## 2020-05-21 PROCEDURE — 97803 PR MED NUTR THER, SUBSQ, INDIV, EA 15 MIN: ICD-10-PCS | Mod: 95,,, | Performed by: DIETITIAN, REGISTERED

## 2020-05-21 PROCEDURE — 99499 NO LOS: ICD-10-PCS | Mod: 95,,, | Performed by: DIETITIAN, REGISTERED

## 2020-05-21 PROCEDURE — 99499 UNLISTED E&M SERVICE: CPT | Mod: 95,,, | Performed by: DIETITIAN, REGISTERED

## 2020-05-21 PROCEDURE — 97803 MED NUTRITION INDIV SUBSEQ: CPT | Mod: 95,,, | Performed by: DIETITIAN, REGISTERED

## 2020-05-21 NOTE — TELEPHONE ENCOUNTER
Called 10 min after visit start time to see if pt needs to rohini our nutrition televisit today. Left message to return call.

## 2020-05-21 NOTE — PROGRESS NOTES
The patient location is: home ([place])  The chief complaint leading to consultation is: morbid obesity s/p bariatric surgery    Visit type: audiovisual    Face to Face time with patient: 30 min  45 minutes of total time spent on the encounter, which includes face to face time and non-face to face time preparing to see the patient (eg, review of tests), Obtaining and/or reviewing separately obtained history, Documenting clinical information in the electronic or other health record, Independently interpreting results (not separately reported) and communicating results to the patient/family/caregiver, or Care coordination (not separately reported).         Each patient to whom he or she provides medical services by telemedicine is:  (1) informed of the relationship between the physician and patient and the respective role of any other health care provider with respect to management of the patient; and (2) notified that he or she may decline to receive medical services by telemedicine and may withdraw from such care at any time.    NUTRITIONAL Note     Referring Physician: Isidro Anderson M.D.   Reason for MNT Referral: Follow up sleeve gastrectomy 2013 with weight regain     PAST MEDICAL HISTORY:   34 y.o. female  Body mass index is 68.57 kg/m².     She states she initially lost 20 lbs after our last visit in January and getting diet back on track and taking wt loss medicine with Dr. Joe. She has gained 10 lbs back over the past 2 months. She had to stop taking the meds for personal health reasons. Quarantine for covid 19 and working from home caused her to cook more at home and get less fast food and take out. Denies emotional eating. Denies junk foods/sweets/sodas. She states she has been staying away from the starchy sides, but did eat more fried foods. Activity level decreased d/t gym being closed. She exercises at home 3 times/week - [employer] live workouts.    She is 1 week into a 2-wk self prescribed diet  reboot right now. She is drinking 2 Premier shakes and 2 scoops Isopure powder with 2 cups unsweetened almond milk.              Past Medical History:   Diagnosis Date    Cervical dysplasia      Cyst, ovarian       pt reported    Gonorrhea      Morbid obesity           CLINICAL DATA:  34 y.o.-year-old Black or  female.  Height: 4 ft. 11 in.  Weight: 330 lbs (self reported)  BMI: 68.5     NUTRITIONAL NEEDS: Bariatric Diet  800-1000 Calories    Grams Protein     NUTRITION & HEALTH HISTORY:  Greatest challenge: sweets, starchy CHO, snacking at night and emotional eating     Current Diet: Reboot liquid diet  Meal pattern: 4 protein supplements     Exercise:     Current exercise: None  Restrictions to exercise: time management. Working 3 jobs.     Vitamins / Minerals / Herbs:   Alive MV Ultra Potency with Iron and B-complex, B12, Ca Citrate + D, Biotin      Labs:   no recent     Food Allergies:   None known     Social:  Works regular daytime shifts. 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  Lives with her .   Grocery shopping and food prep done by the pt.  Patient believes her  will be supportive after surgery.  Alcohol: None.  Smoking: None.     ASSESSMENT:  Patient states willingness and demonstrates willingness to change lifestyle and make behavior modifications as evidenced by weight loss, dietary changes, including protein drinks, increased vegetables, reduced dining out, more food preparation at [employer], healthier cooking at home, bringing snacks to work, healthier snacking at work and healthier snacking at home.     Barriers to Education: none     Stage of change: action     NUTRITION DIAGNOSIS:    Morbid Obesity related to Excessive carbohydrate intake and Physical inactivity as evidence by BMI.     BARIATRIC DIET DISCUSSION/PLAN:  Reviewed bariatric nutrition guidelines.  Answered all questions.    After fininshing reboot liquid diet, resume  bariatric diet plan 5-6 small meals/day (2 prot supplements a day)  Exercise: Maintain or increase. Facebook live videos and walking at the park     Pt wishes to have f/u televisit with RD in 2 weeks for review of diet plan.     Patient verbalized understanding.     Communicated nutrition plan with bariatric team.     SESSION TIME:  30 minutes

## 2020-06-04 ENCOUNTER — CLINICAL SUPPORT (OUTPATIENT)
Dept: BARIATRICS | Facility: CLINIC | Age: 35
End: 2020-06-04
Payer: MEDICAID

## 2020-06-04 DIAGNOSIS — Z98.84 S/P LAPAROSCOPIC SLEEVE GASTRECTOMY: ICD-10-CM

## 2020-06-04 DIAGNOSIS — Z71.3 DIETARY COUNSELING: ICD-10-CM

## 2020-06-04 DIAGNOSIS — E66.01 MORBID OBESITY WITH BMI OF 50.0-59.9, ADULT: ICD-10-CM

## 2020-06-04 PROCEDURE — 97803 PR MED NUTR THER, SUBSQ, INDIV, EA 15 MIN: ICD-10-PCS | Mod: 95,,, | Performed by: DIETITIAN, REGISTERED

## 2020-06-04 PROCEDURE — 97803 MED NUTRITION INDIV SUBSEQ: CPT | Mod: 95,,, | Performed by: DIETITIAN, REGISTERED

## 2020-06-04 NOTE — PROGRESS NOTES
The patient location is: home ([place])  The chief complaint leading to consultation is: s/p bariatric surgery    Visit type: audiovisual    Face to Face time with patient: 30 min  30 minutes of total time spent on the encounter, which includes face to face time and non-face to face time preparing to see the patient (eg, review of tests), Obtaining and/or reviewing separately obtained history, Documenting clinical information in the electronic or other health record, Independently interpreting results (not separately reported) and communicating results to the patient/family/caregiver, or Care coordination (not separately reported).         Each patient to whom he or she provides medical services by telemedicine is:  (1) informed of the relationship between the physician and patient and the respective role of any other health care provider with respect to management of the patient; and (2) notified that he or she may decline to receive medical services by telemedicine and may withdraw from such care at any time.    NUTRITIONAL Note     Referring Physician: Isidro Anderson M.D.   Reason for MNT Referral: Follow up sleeve gastrectomy 2013 with weight regain     PAST MEDICAL HISTORY:   34 y.o. female  Body mass index is 65    States she has lost 7 lbs over the past 3 weeks on Reboot diet. She drank liquid protein shakes for the first 2 weeks. For the past week she has had 2 protein shakes and a salad with lean protein. She has been exercising with her daughter.             Past Medical History:   Diagnosis Date    Cervical dysplasia      Cyst, ovarian       pt reported    Gonorrhea      Morbid obesity           CLINICAL DATA:  34 y.o.-year-old Black or  female.  Height: 4 ft. 11 in.  Weight: 323 lbs (self reported)  BMI: 65.2     NUTRITIONAL NEEDS: Bariatric Diet  800-1000 Calories    Grams Protein     NUTRITION & HEALTH HISTORY:  Greatest challenge: sweets, starchy CHO, snacking at night and  emotional eating     Current Diet:     - Protein shake  - Protein shake  - salad with grilled chicken, 2 boiled eggs, vinegar    Diet includes:  Protein supplements: Gl[employer]ator protein powder (3g sugar) + fruits + almond milk, OR Premier shakes     Exercise:  5 days/week - [employer] cardio workouts with her daughter. Dancing videos on [employer]. Park still not open.  Restrictions to exercise: time management. Working 3 jobs.     Vitamins / Minerals / Herbs:   Alive MV Ultra Potency with Iron and B-complex, B12, Ca Citrate + D, Biotin     Labs:   no recent     Food Allergies:   None known     Social:  Works regular daytime shifts. 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  Lives with her .   Grocery shopping and food prep done by the pt.  Patient believes her  will be supportive after surgery.  Alcohol: None.  Smoking: None.     ASSESSMENT:  Patient demonstrates willingness to change lifestyle and make behavior modifications as evidenced by weight loss, dietary changes, including protein drinks, increased vegetables, reduced dining out, more food preparation at [employer], healthier cooking at home, bringing snacks to work, healthier snacking at work and healthier snacking at home.     Barriers to Education: none     Stage of change: action     NUTRITION DIAGNOSIS:    Morbid Obesity related to Excessive carbohydrate intake and Physical inactivity as evidence by BMI.     BARIATRIC DIET DISCUSSION/PLAN:  Pt plans to continue her current diet plan, gradually adding in some food variety.  Reviewed bariatric nutrition guidelines.  Answered all questions.     Pt wishes to have f/u televisit with RD in 4 weeks for review of diet plan.     Patient verbalized understanding.     Communicated nutrition plan with bariatric team.     SESSION TIME:  30 minutes

## 2020-07-01 ENCOUNTER — TELEPHONE (OUTPATIENT)
Dept: BARIATRICS | Facility: CLINIC | Age: 35
End: 2020-07-01

## 2021-03-04 ENCOUNTER — OFFICE VISIT (OUTPATIENT)
Dept: BARIATRICS | Facility: CLINIC | Age: 36
End: 2021-03-04
Payer: MEDICAID

## 2021-03-04 DIAGNOSIS — E66.01 CLASS 3 SEVERE OBESITY DUE TO EXCESS CALORIES WITH SERIOUS COMORBIDITY AND BODY MASS INDEX (BMI) OF 60.0 TO 69.9 IN ADULT: ICD-10-CM

## 2021-03-04 DIAGNOSIS — Z98.84 S/P LAPAROSCOPIC SLEEVE GASTRECTOMY: ICD-10-CM

## 2021-03-04 PROCEDURE — 99213 PR OFFICE/OUTPT VISIT, EST, LEVL III, 20-29 MIN: ICD-10-PCS | Mod: 95,,, | Performed by: INTERNAL MEDICINE

## 2021-03-04 PROCEDURE — 99213 OFFICE O/P EST LOW 20 MIN: CPT | Mod: 95,,, | Performed by: INTERNAL MEDICINE

## 2021-03-04 RX ORDER — PHENTERMINE HYDROCHLORIDE 37.5 MG/1
TABLET ORAL
Qty: 15 TABLET | Refills: 0 | Status: SHIPPED | OUTPATIENT
Start: 2021-03-04

## 2021-03-11 ENCOUNTER — TELEPHONE (OUTPATIENT)
Dept: BARIATRICS | Facility: CLINIC | Age: 36
End: 2021-03-11

## 2021-03-28 ENCOUNTER — NURSE TRIAGE (OUTPATIENT)
Dept: ADMINISTRATIVE | Facility: CLINIC | Age: 36
End: 2021-03-28

## 2021-04-16 ENCOUNTER — PATIENT MESSAGE (OUTPATIENT)
Dept: RESEARCH | Facility: HOSPITAL | Age: 36
End: 2021-04-16

## 2021-08-03 ENCOUNTER — HOSPITAL ENCOUNTER (EMERGENCY)
Facility: HOSPITAL | Age: 36
Discharge: HOME | End: 2021-08-03
Attending: EMERGENCY MEDICINE
Payer: MEDICAID

## 2021-08-03 VITALS
DIASTOLIC BLOOD PRESSURE: 93 MMHG | HEIGHT: 59 IN | WEIGHT: 293 LBS | TEMPERATURE: 98 F | RESPIRATION RATE: 20 BRPM | OXYGEN SATURATION: 100 % | HEART RATE: 85 BPM | SYSTOLIC BLOOD PRESSURE: 126 MMHG | BODY MASS INDEX: 59.07 KG/M2

## 2021-08-03 DIAGNOSIS — Z20.822 CLOSE EXPOSURE TO COVID-19 VIRUS: Primary | ICD-10-CM

## 2021-08-03 LAB
B-HCG UR QL: NEGATIVE
CTP QC/QA: YES
CTP QC/QA: YES
SARS-COV-2 RDRP RESP QL NAA+PROBE: NEGATIVE

## 2021-08-03 PROCEDURE — 81025 URINE PREGNANCY TEST: CPT | Mod: ER | Performed by: EMERGENCY MEDICINE

## 2021-08-03 PROCEDURE — 99284 EMERGENCY DEPT VISIT MOD MDM: CPT | Mod: ER

## 2021-08-03 PROCEDURE — U0002 COVID-19 LAB TEST NON-CDC: HCPCS | Mod: ER | Performed by: EMERGENCY MEDICINE

## 2021-08-03 RX ORDER — CETIRIZINE HYDROCHLORIDE 10 MG/1
10 TABLET ORAL DAILY
Qty: 30 TABLET | Refills: 0 | Status: SHIPPED | OUTPATIENT
Start: 2021-08-03 | End: 2022-08-03

## 2021-08-03 RX ORDER — BENZONATATE 100 MG/1
100 CAPSULE ORAL 3 TIMES DAILY PRN
Qty: 30 CAPSULE | Refills: 0 | Status: SHIPPED | OUTPATIENT
Start: 2021-08-03 | End: 2021-08-13

## 2021-08-03 RX ORDER — ALBUTEROL SULFATE 90 UG/1
1-2 AEROSOL, METERED RESPIRATORY (INHALATION) EVERY 6 HOURS PRN
Qty: 18 G | Refills: 0 | Status: SHIPPED | OUTPATIENT
Start: 2021-08-03 | End: 2022-08-03

## 2021-08-03 RX ORDER — GUAIFENESIN 100 MG/5ML
100 SOLUTION ORAL EVERY 4 HOURS PRN
Qty: 60 ML | Refills: 0 | Status: SHIPPED | OUTPATIENT
Start: 2021-08-03 | End: 2021-08-13

## 2021-08-03 RX ORDER — ONDANSETRON 8 MG/1
8 TABLET, ORALLY DISINTEGRATING ORAL EVERY 6 HOURS PRN
Qty: 30 TABLET | Refills: 0 | Status: SHIPPED | OUTPATIENT
Start: 2021-08-03

## 2022-03-07 ENCOUNTER — PATIENT MESSAGE (OUTPATIENT)
Dept: BARIATRICS | Facility: CLINIC | Age: 37
End: 2022-03-07
Payer: MEDICAID

## 2022-04-07 ENCOUNTER — PATIENT MESSAGE (OUTPATIENT)
Dept: BARIATRICS | Facility: CLINIC | Age: 37
End: 2022-04-07
Payer: MEDICAID

## 2022-04-12 ENCOUNTER — PATIENT MESSAGE (OUTPATIENT)
Dept: BARIATRICS | Facility: CLINIC | Age: 37
End: 2022-04-12
Payer: MEDICAID

## 2022-05-05 ENCOUNTER — PATIENT MESSAGE (OUTPATIENT)
Dept: BARIATRICS | Facility: CLINIC | Age: 37
End: 2022-05-05
Payer: MEDICAID

## 2022-05-10 ENCOUNTER — PATIENT MESSAGE (OUTPATIENT)
Dept: BARIATRICS | Facility: CLINIC | Age: 37
End: 2022-05-10
Payer: MEDICAID

## 2022-06-10 ENCOUNTER — PATIENT MESSAGE (OUTPATIENT)
Dept: BARIATRICS | Facility: CLINIC | Age: 37
End: 2022-06-10
Payer: MEDICAID

## 2022-06-14 ENCOUNTER — PATIENT MESSAGE (OUTPATIENT)
Dept: BARIATRICS | Facility: CLINIC | Age: 37
End: 2022-06-14
Payer: MEDICAID

## 2022-07-05 ENCOUNTER — PATIENT MESSAGE (OUTPATIENT)
Dept: BARIATRICS | Facility: CLINIC | Age: 37
End: 2022-07-05
Payer: MEDICAID

## 2022-08-02 ENCOUNTER — PATIENT MESSAGE (OUTPATIENT)
Dept: BARIATRICS | Facility: CLINIC | Age: 37
End: 2022-08-02
Payer: MEDICAID

## 2022-08-04 ENCOUNTER — PATIENT MESSAGE (OUTPATIENT)
Dept: BARIATRICS | Facility: CLINIC | Age: 37
End: 2022-08-04
Payer: MEDICAID

## 2022-09-07 ENCOUNTER — PATIENT MESSAGE (OUTPATIENT)
Dept: BARIATRICS | Facility: CLINIC | Age: 37
End: 2022-09-07
Payer: MEDICAID

## 2022-10-06 ENCOUNTER — PATIENT MESSAGE (OUTPATIENT)
Dept: BARIATRICS | Facility: CLINIC | Age: 37
End: 2022-10-06
Payer: MEDICAID

## 2022-11-04 ENCOUNTER — PATIENT MESSAGE (OUTPATIENT)
Dept: BARIATRICS | Facility: CLINIC | Age: 37
End: 2022-11-04
Payer: MEDICAID

## 2022-12-07 ENCOUNTER — PATIENT MESSAGE (OUTPATIENT)
Dept: BARIATRICS | Facility: CLINIC | Age: 37
End: 2022-12-07
Payer: MEDICAID

## 2023-01-09 ENCOUNTER — PATIENT MESSAGE (OUTPATIENT)
Dept: BARIATRICS | Facility: CLINIC | Age: 38
End: 2023-01-09
Payer: MEDICAID

## 2023-02-09 ENCOUNTER — PATIENT MESSAGE (OUTPATIENT)
Dept: BARIATRICS | Facility: CLINIC | Age: 38
End: 2023-02-09
Payer: MEDICAID

## 2023-02-14 ENCOUNTER — PATIENT MESSAGE (OUTPATIENT)
Dept: BARIATRICS | Facility: CLINIC | Age: 38
End: 2023-02-14
Payer: MEDICAID

## 2023-02-22 ENCOUNTER — PATIENT MESSAGE (OUTPATIENT)
Dept: BARIATRICS | Facility: CLINIC | Age: 38
End: 2023-02-22
Payer: MEDICAID

## 2024-08-02 ENCOUNTER — OFFICE VISIT (OUTPATIENT)
Dept: URBAN - METROPOLITAN AREA CLINIC 25 | Facility: CLINIC | Age: 39
End: 2024-08-02